# Patient Record
Sex: FEMALE | Race: OTHER | NOT HISPANIC OR LATINO
[De-identification: names, ages, dates, MRNs, and addresses within clinical notes are randomized per-mention and may not be internally consistent; named-entity substitution may affect disease eponyms.]

---

## 2019-03-14 ENCOUNTER — APPOINTMENT (OUTPATIENT)
Dept: OTOLARYNGOLOGY | Facility: CLINIC | Age: 69
End: 2019-03-14
Payer: MEDICARE

## 2019-03-14 VITALS
DIASTOLIC BLOOD PRESSURE: 66 MMHG | SYSTOLIC BLOOD PRESSURE: 146 MMHG | BODY MASS INDEX: 21.97 KG/M2 | HEART RATE: 68 BPM | WEIGHT: 124 LBS | HEIGHT: 63 IN

## 2019-03-14 DIAGNOSIS — Z87.448 PERSONAL HISTORY OF OTHER DISEASES OF URINARY SYSTEM: ICD-10-CM

## 2019-03-14 DIAGNOSIS — Z78.9 OTHER SPECIFIED HEALTH STATUS: ICD-10-CM

## 2019-03-14 DIAGNOSIS — Z00.00 ENCOUNTER FOR GENERAL ADULT MEDICAL EXAMINATION W/OUT ABNORMAL FINDINGS: ICD-10-CM

## 2019-03-14 DIAGNOSIS — Z82.2 FAMILY HISTORY OF DEAFNESS AND HEARING LOSS: ICD-10-CM

## 2019-03-14 DIAGNOSIS — H91.93 UNSPECIFIED HEARING LOSS, BILATERAL: ICD-10-CM

## 2019-03-14 DIAGNOSIS — H90.71 MIXED CONDUCTIVE AND SENSORINEURAL HEARING LOSS, UNILATERAL, RIGHT EAR, WITH UNRESTRICTED HEARING ON THE CONTRALATERAL SIDE: ICD-10-CM

## 2019-03-14 PROCEDURE — 99214 OFFICE O/P EST MOD 30 MIN: CPT | Mod: 25

## 2019-03-14 PROCEDURE — 31575 DIAGNOSTIC LARYNGOSCOPY: CPT

## 2019-03-14 PROCEDURE — 92557 COMPREHENSIVE HEARING TEST: CPT

## 2019-03-14 PROCEDURE — 92567 TYMPANOMETRY: CPT

## 2019-03-14 RX ORDER — LORAZEPAM 2 MG/1
2 TABLET ORAL
Qty: 90 | Refills: 0 | Status: ACTIVE | COMMUNITY
Start: 2018-11-19

## 2019-03-14 NOTE — HISTORY OF PRESENT ILLNESS
[de-identified] : CHUY BRITTON is a 68 year old female who comes in complaining of A a persistent filling the entire right ear is blocked. She notes intermittent pulsation but is unsure whether it is associated with her heartbeat. She has less congestion on the left ear and that improves when she pops her ear. She denies otalgia or otorrhea. She complains that she has a persistent upper respiratory tract infection above her throat. She has a history of parathyroidectomy and renal disease and oral dryness. She is going to start dialysis next week.The patient had no other ear nose or throat complaints at this visit.

## 2019-03-14 NOTE — PHYSICAL EXAM
[FreeTextEntry1] : General:\par The patient was alert and oriented and in no distress.\par Voice was clear.\par \par Oral cavity:\par The oral mucosa was normal.\par The oral and base of tongue were clear and without mass.\par The gingival and buccal mucosa were moist and without lesions.\par The palate moved well.\par There was no cleft to the palate.\par There appeared to be good salivary flow.  \par There was no pus, erythema or mass in the oral cavity. Her oral mucosa was quite dry.\par \par Neck: \par The neck was symmetrical.\par The parotid and submandibular glands were normal without masses.\par The trachea was midline and there was no unusual crepitus.\par The thyroid was smooth and nontender and no masses were palpated.\par There was no significant cervical adenopathy.\par She has a well-healed incision.\par \par Ears:\par The external ears were normal without deformity.\par The ear canals were clear.\par The tympanic membranes were intact and normal.\par The canals were somewhat dry.\par \par TMJ:\par The temporomandibular joints were nontender.\par She has crepitus and a cross bite.\par More on the right than on the left.\par \par Neuro:\par Neurologically, the patient was awake, alert, and oriented to person, place and time. There were no obvious focal neurologic abnormalities.  Cranial nerves II through XII were grossly intact.\par She has bruits in both necks\par \par \par Nasal endoscopy: \par CPT 40551\par Procedure Note:\par \par Nasal endoscopy was done with topical anesthesia of Pontocaine and Afrin and a      nasal endoscope.\par Indication: Nasal congestion, rule out sinusitis.\par Procedure: The nasal cavity was anesthetized with topical Afrin and Pontocaine. An  endoscope was used and inserted into the nasal cavity.\par Attention was first paid to the anterior nasal cavity.\par The nasal mucosa was moderately boggy with a significant left septal deflection coming back to the right. There were no polyps, purulence or masses. The nasopharynx was benign.\par \par Flexible fiberoptic laryngoscopy: CPT 77442\par Indications: Dysphagia\par Procedure note:\par  \par Flexible fiberoptic laryngoscopy was performed because of dysphagia and the patient's inability to tolerate adequate mirror examination.\par The nasal cavity was anesthetized with Pontocaine plus Afrin.\par \par A flexible fiberoptic laryngoscope was placed into the patient's nasal cavity.\par The nasopharynx was without mass.\par The oropharynx and base of tongue were normal and without mass.\par The epiglottis and base of tongue were normal.\par The piriform sinuses were without mucosal lesions or pooling of secretions.\par The lateral pharyngeal walls were clear and symmetrical.\par The vocal cords were clear and mobile; the abducted and adducted normally.\par There was significant posterior laryngeal inflammation consistent with reflux.\par \par Her audiogram showed normal hearing in the left ear with a small low-frequency conductive hearing loss in the right ear with a shallow tympanograms \par This was reviewed with her.\par \par

## 2019-03-14 NOTE — HISTORY OF PRESENT ILLNESS
[de-identified] : CHUY BRITTON is a 68 year old female who comes in complaining of A a persistent filling the entire right ear is blocked. She notes intermittent pulsation but is unsure whether it is associated with her heartbeat. She has less congestion on the left ear and that improves when she pops her ear. She denies otalgia or otorrhea. She complains that she has a persistent upper respiratory tract infection above her throat. She has a history of parathyroidectomy and renal disease and oral dryness. She is going to start dialysis next week.The patient had no other ear nose or throat complaints at this visit.

## 2019-03-14 NOTE — ASSESSMENT
[FreeTextEntry1] : It was my impression that she has the feeling of fullness and pulsation in the right ear.  some of this is likely from her temporomandibular joint.  She has crepitus and a right cross bite. I recommended warm c\par She has a conductive Component in the right ear With a shallow tympanogram on that side.\par Her persistent sore throat and her ear fullness also likely largely due to reflux.\par I suggested using Zantac regularly, 300 mg at bedtime. She has been taking it intermittently after eating and complained significantly of reflux.\par I suggested an antireflux diet and elevation of the head of the bed.\par Her oral dryness is likely from her medication.\par Because of the pulsatile nature of the fullness and bruits in her neck I suggested that she speak to her internist about carotid Dopplers as well if not done\par Otherwise I reassured her and suggested follow up in 3 months or earlier if her symptoms persist.\par

## 2019-03-14 NOTE — PHYSICAL EXAM
[FreeTextEntry1] : General:\par The patient was alert and oriented and in no distress.\par Voice was clear.\par \par Oral cavity:\par The oral mucosa was normal.\par The oral and base of tongue were clear and without mass.\par The gingival and buccal mucosa were moist and without lesions.\par The palate moved well.\par There was no cleft to the palate.\par There appeared to be good salivary flow.  \par There was no pus, erythema or mass in the oral cavity. Her oral mucosa was quite dry.\par \par Neck: \par The neck was symmetrical.\par The parotid and submandibular glands were normal without masses.\par The trachea was midline and there was no unusual crepitus.\par The thyroid was smooth and nontender and no masses were palpated.\par There was no significant cervical adenopathy.\par She has a well-healed incision.\par \par Ears:\par The external ears were normal without deformity.\par The ear canals were clear.\par The tympanic membranes were intact and normal.\par The canals were somewhat dry.\par \par TMJ:\par The temporomandibular joints were nontender.\par She has crepitus and a cross bite.\par More on the right than on the left.\par \par Neuro:\par Neurologically, the patient was awake, alert, and oriented to person, place and time. There were no obvious focal neurologic abnormalities.  Cranial nerves II through XII were grossly intact.\par She has bruits in both necks\par \par \par Nasal endoscopy: \par CPT 53200\par Procedure Note:\par \par Nasal endoscopy was done with topical anesthesia of Pontocaine and Afrin and a      nasal endoscope.\par Indication: Nasal congestion, rule out sinusitis.\par Procedure: The nasal cavity was anesthetized with topical Afrin and Pontocaine. An  endoscope was used and inserted into the nasal cavity.\par Attention was first paid to the anterior nasal cavity.\par The nasal mucosa was moderately boggy with a significant left septal deflection coming back to the right. There were no polyps, purulence or masses. The nasopharynx was benign.\par \par Flexible fiberoptic laryngoscopy: CPT 41485\par Indications: Dysphagia\par Procedure note:\par  \par Flexible fiberoptic laryngoscopy was performed because of dysphagia and the patient's inability to tolerate adequate mirror examination.\par The nasal cavity was anesthetized with Pontocaine plus Afrin.\par \par A flexible fiberoptic laryngoscope was placed into the patient's nasal cavity.\par The nasopharynx was without mass.\par The oropharynx and base of tongue were normal and without mass.\par The epiglottis and base of tongue were normal.\par The piriform sinuses were without mucosal lesions or pooling of secretions.\par The lateral pharyngeal walls were clear and symmetrical.\par The vocal cords were clear and mobile; the abducted and adducted normally.\par There was significant posterior laryngeal inflammation consistent with reflux.\par \par Her audiogram showed normal hearing in the left ear with a small low-frequency conductive hearing loss in the right ear with a shallow tympanograms \par This was reviewed with her.\par \par

## 2021-05-04 ENCOUNTER — APPOINTMENT (OUTPATIENT)
Dept: OTOLARYNGOLOGY | Facility: CLINIC | Age: 71
End: 2021-05-04
Payer: MEDICARE

## 2021-05-04 VITALS — TEMPERATURE: 97.1 F

## 2021-05-04 DIAGNOSIS — R07.0 PAIN IN THROAT: ICD-10-CM

## 2021-05-04 PROCEDURE — 31575 DIAGNOSTIC LARYNGOSCOPY: CPT

## 2021-05-04 PROCEDURE — 92557 COMPREHENSIVE HEARING TEST: CPT

## 2021-05-04 PROCEDURE — 92567 TYMPANOMETRY: CPT

## 2021-05-04 PROCEDURE — 99213 OFFICE O/P EST LOW 20 MIN: CPT | Mod: 25

## 2021-05-04 NOTE — HISTORY OF PRESENT ILLNESS
[de-identified] : Patient previously seen by Dr. Bernal March 2019 were right ear fullness, intermittent pulsatile sensation, left ear congestion, and throat discomfort. She has a history of parathyroidectomy and renal disease on dialysis  as well as oral dryness.  She was diagnosed with TMJ crepitus, reflux, mild right conductive hearing loss.\par \par Today he complains of several months of intermittent left-sided otalgia. It was and yesterday, and she has occasional headaches. He otalgia worsens when she bites down, but reports she does not have teeth in her left maxilla. She does not wear her partial denture.  Has not been to the dentist recently.  Denies otalgia, otorrhea, tinnitus, or vertigo.  Patient has no previous otologic history or acoustic trauma.

## 2021-05-04 NOTE — ASSESSMENT
[FreeTextEntry1] : Several months of intermittent left otalgia. We discussed non-otologic sources of otalgia including dental, and I asked her to see her dentist. (She has no teeth and the left maxilla however. )  Discussed referred otalgia from head and neck masses, which I do not see of palpate at BOT/tonsil.  I believe she has TMJ as the source of her symptoms, and recommended TMJ precautions. A written handout was given. She will follow up in 2-3 weeks, though he consider imaging studies at that time.  I reminded her this was also her diagnosis with she saw Dr Bernal 3/2019\par \par I reassured her that her hearing showed only very mild sensory neural loss across the board, which we will monitor

## 2021-05-25 ENCOUNTER — APPOINTMENT (OUTPATIENT)
Dept: OTOLARYNGOLOGY | Facility: CLINIC | Age: 71
End: 2021-05-25
Payer: MEDICARE

## 2021-05-25 VITALS — TEMPERATURE: 98 F

## 2021-05-25 DIAGNOSIS — H69.80 OTHER SPECIFIED DISORDERS OF EUSTACHIAN TUBE, UNSPECIFIED EAR: ICD-10-CM

## 2021-05-25 PROCEDURE — 99214 OFFICE O/P EST MOD 30 MIN: CPT

## 2021-05-25 NOTE — ASSESSMENT
[FreeTextEntry1] : Several months of intermittent left otalgia, improved spontaneously by 50%.  Will try AStelin and Flonase Rx for possible ETD, FU 4 weeks.   Discussed referred otalgia from head and neck masses, which I do not see of palpate at BOT/tonsil.  I believe she has TMJ as the source of her symptoms, and recommended TMJ precautions.Cconsider imaging studies at that time.  I reminded her this was also her diagnosis with she saw Dr Bernal 3/2019\par \par

## 2021-05-25 NOTE — HISTORY OF PRESENT ILLNESS
[de-identified] : Patient previously seen by Dr. Bernal March 2019 with right ear fullness, intermittent pulsatile sensation, left ear congestion, and throat discomfort. She has a history of parathyroidectomy and renal disease on dialysis  as well as oral dryness.  She was diagnosed with TMJ crepitus, reflux, mild right conductive hearing loss.\par \par Seen May 4, complained of several months of intermittent left-sided otalgia. She has occasional headaches. The otalgia worsens when she bites down, but reports she does not have teeth in her left maxilla. She does not wear her partial denture.  Had not been to the dentist recently.  Denies otalgia, otorrhea, tinnitus, or vertigo.  Patient has no previous otologic history or acoustic trauma.  Scope and BOT exam were normal, type A tympanograms, suspected TMJ.  Recommended dental eval who denied TMJ etiology.  Pain 50% improved, on Tylenol occ.  Still has some aural fullness.  Wants to retry the nasal spray previously given by Dr Bernal

## 2021-06-29 ENCOUNTER — APPOINTMENT (OUTPATIENT)
Dept: OTOLARYNGOLOGY | Facility: CLINIC | Age: 71
End: 2021-06-29
Payer: MEDICARE

## 2021-06-29 VITALS — TEMPERATURE: 98.5 F

## 2021-06-29 DIAGNOSIS — K21.9 GASTRO-ESOPHAGEAL REFLUX DISEASE W/OUT ESOPHAGITIS: ICD-10-CM

## 2021-06-29 PROCEDURE — 31231 NASAL ENDOSCOPY DX: CPT

## 2021-06-29 PROCEDURE — 92567 TYMPANOMETRY: CPT

## 2021-06-29 PROCEDURE — 99214 OFFICE O/P EST MOD 30 MIN: CPT | Mod: 25

## 2021-06-29 NOTE — PHYSICAL EXAM
[FreeTextEntry1] : \par The patient was alert and oriented and in no distress.\par Voice was clear.\par \par Face:\par The patient had no facial asymmetry or mass.\par The skin was unremarkable.\par \par Eyes:\par The pupils were equal round and reactive to light and accommodation.\par There was no significant nystagmus or disconjugate gaze noted.\par \par Nose: \par The external nose had no significant deformity.  There was no facial tenderness.  On anterior rhinoscopy, the nasal mucosa was clear.  The anterior septum was midline.  There were no visualized polyps purulence  or masses.\par \par Oral cavity:\par The oral mucosa was normal.\par The oral and base of tongue were clear and without mass.\par The gingival and buccal mucosa were moist and without lesions.\par The palate moved well.\par There was no cleft to the palate.\par There appeared to be good salivary flow.  \par There was no pus, erythema or mass in the oral cavity.\par \par \par Ears:\par The external ears were normal without deformity.\par The ear canals were clear.\par The tympanic membranes were intact and normal.\par \par Neck: \par The neck was symmetrical.\par The parotid and submandibular glands were normal without masses.\par The trachea was midline and there was no unusual crepitus.\par The thyroid was smooth and nontender and no masses were palpated.\par There was no significant cervical adenopathy.\par \par \par Neuro:\par Neurologically, the patient was awake, alert, and oriented to person, place and time. There were no obvious focal neurologic abnormalities.  Cranial nerves II through XII were grossly intact.\par \par \par TMJ:\par The temporomandibular joints were nontender.\par There was no abnormal crepitus and no significant malocclusion\par The jaw was tight but improved her cross bite was better..\par \par \par  [de-identified] : Tympanograms were ordered and were type A bilaterally\par \par Nasal endoscopy: \par CPT 43309\par Procedure Note:\par \par Endoscopy was done with Covid precautions and with video. All risks and benefits were discussed with the patient and consent obtained.\par \par Nasal endoscopy was done with topical anesthesia of Pontocaine and Afrin and a      nasal endoscope.\par Indication: Nasal congestion, rule out sinusitis.\par Procedure: The nasal cavity was anesthetized with topical Afrin and Pontocaine. An  endoscope was used and inserted into the nasal cavity.\par Attention was first paid to the anterior nasal cavity.\par Endocoscopy was performed to inspect the interior of the nasal cavity, the nasal septum,  the middle and superior meati, the inferior, middle and superior turbinates, and the spheno-ethmoidal  recesses, the nasopharynx and eustachian tube orifices bilaterally. \par All findings were normal except:\par \par This mucosa is moderately boggy with a left septal deflection without polyps, purulence or masses.\par \par Flexible fiberoptic laryngoscopy: CPT 78052\par Indications: Dysphagia\par Procedure note:\par \par Flexible fiberoptic laryngoscopy was performed because of dysphagia and because of the patient's inability to tolerate adequate mirror examination.\par \par The nasal cavity was anesthetized with Pontocaine and Afrin.\par The flexible endoscope was placed into the patient's nasal cavity.\par The nasopharynx was without masses.\par The oropharynx, vallecula and base of tongue had no masses.\par The epiglottis, aryepiglottic folds and false vocal cords were normal.\par The pyriform sinuses were without mucosal lesions or pooling of secretions.  \par The laryngeal ventricles were without lesions.\par The visualized subglottis was without mass.\par The lateral and posterior pharyngeal walls were clear and symmetrical.\par The vocal folds were clear and mobile; they abducted and adducted normally.\par There was no interarytenoid mass or fullness.\par \par Endoscopy was done with Covid precautions and with video. All risks and benefits were discussed with the patient and consent obtained. There was moderate posterior laryngeal inflammation\par \par She has tightness of the left sternomastoid muscle\par Her base of tongue was soft to palpation and she has no internal pterygoid muscle tenderness

## 2021-06-29 NOTE — HISTORY OF PRESENT ILLNESS
[de-identified] : CHUY FRANKS Was seen in followup on June 29. I had seen her 2-1/2 years ago with otalgia and findings consistent with a temporomandibular joint dysfunction. She had seen  last month and was treated with nasal steroids and for her temporomandibular joint. She notes that her hearing is fine and her pain has resolved but she still feels full on the left. She denies any significant reflux.The patient had no other ear nose or throat complaints at this visit.

## 2021-06-29 NOTE — ASSESSMENT
[FreeTextEntry1] : It was my impression that she still has the fullness of the left ear. Her temporomandibular joint findings are improved. She does have a tightness of the left sternomastoid muscle and I recommended one compress. She cannot use and anti-inflammatory and I suggested using Tylenol.\par Septal deflection and a rhinitis but I did not believe eustachian tube dysfunction she still has type A. tympanograms.\par She does have laryngeal evidence of reflux which may well be the cause of the fullness\par It was my impression is that the patient's symptoms were from laryngeal evidence of reflux.  I reviewed an anti-reflux diet at length with the patient.  I recommended a course of famotidine 40 mg at bedtime.  I suggested a repeat evaluation in 4-6 weeks to make sure that the patient is improving.  If not I would recommend further evaluation including possibly pH testing, PPI therapy and/or further GI evaluation.\par

## 2021-08-03 ENCOUNTER — APPOINTMENT (OUTPATIENT)
Dept: OTOLARYNGOLOGY | Facility: CLINIC | Age: 71
End: 2021-08-03

## 2021-11-09 ENCOUNTER — NON-APPOINTMENT (OUTPATIENT)
Age: 71
End: 2021-11-09

## 2022-08-01 ENCOUNTER — APPOINTMENT (OUTPATIENT)
Dept: INTERNAL MEDICINE | Facility: CLINIC | Age: 72
End: 2022-08-01

## 2022-08-01 VITALS
HEIGHT: 60 IN | SYSTOLIC BLOOD PRESSURE: 126 MMHG | WEIGHT: 109 LBS | BODY MASS INDEX: 21.4 KG/M2 | TEMPERATURE: 97.8 F | HEART RATE: 64 BPM | DIASTOLIC BLOOD PRESSURE: 76 MMHG | OXYGEN SATURATION: 97 %

## 2022-08-01 PROCEDURE — 99204 OFFICE O/P NEW MOD 45 MIN: CPT | Mod: GC

## 2022-08-01 RX ORDER — HYDRALAZINE HYDROCHLORIDE 25 MG/1
25 TABLET ORAL
Qty: 90 | Refills: 0 | Status: COMPLETED | COMMUNITY
Start: 2019-01-16 | End: 2022-08-01

## 2022-08-01 RX ORDER — CARVEDILOL 6.25 MG/1
6.25 TABLET, FILM COATED ORAL
Qty: 120 | Refills: 0 | Status: COMPLETED | COMMUNITY
Start: 2019-01-02 | End: 2022-08-01

## 2022-08-01 RX ORDER — FLUTICASONE PROPIONATE 50 UG/1
50 SPRAY, METERED NASAL
Qty: 1 | Refills: 1 | Status: COMPLETED | COMMUNITY
Start: 2021-05-25 | End: 2022-08-01

## 2022-08-01 RX ORDER — FLUTICASONE PROPIONATE 50 UG/1
50 SPRAY, METERED NASAL DAILY
Qty: 1 | Refills: 5 | Status: COMPLETED | COMMUNITY
Start: 2019-03-14 | End: 2022-08-01

## 2022-08-01 RX ORDER — IRON POLYSACCHARIDE COMPLEX 150 MG
150 CAPSULE ORAL
Qty: 30 | Refills: 0 | Status: COMPLETED | COMMUNITY
Start: 2019-01-17 | End: 2022-08-01

## 2022-08-01 RX ORDER — SODIUM BICARBONATE 650 MG/1
650 TABLET ORAL
Qty: 180 | Refills: 0 | Status: COMPLETED | COMMUNITY
Start: 2019-01-24 | End: 2022-08-01

## 2022-08-01 RX ORDER — CLONIDINE HYDROCHLORIDE 0.2 MG/1
0.2 TABLET ORAL
Qty: 60 | Refills: 0 | Status: COMPLETED | COMMUNITY
Start: 2019-01-17 | End: 2022-08-01

## 2022-08-01 RX ORDER — RANITIDINE 300 MG/1
300 TABLET ORAL
Qty: 30 | Refills: 5 | Status: COMPLETED | COMMUNITY
Start: 2019-03-14 | End: 2022-08-01

## 2022-08-01 RX ORDER — HYDRALAZINE HYDROCHLORIDE 50 MG/1
50 TABLET ORAL
Qty: 180 | Refills: 0 | Status: COMPLETED | COMMUNITY
Start: 2019-01-24 | End: 2022-08-01

## 2022-08-01 RX ORDER — NIFEDIPINE 60 MG/1
60 TABLET, FILM COATED, EXTENDED RELEASE ORAL
Qty: 60 | Refills: 0 | Status: COMPLETED | COMMUNITY
Start: 2019-01-16 | End: 2022-08-01

## 2022-08-01 RX ORDER — LITHIUM CARBONATE 300 MG/1
300 TABLET ORAL
Qty: 90 | Refills: 0 | Status: COMPLETED | COMMUNITY
Start: 2018-09-17 | End: 2022-08-01

## 2022-08-01 RX ORDER — LITHIUM CARBONATE 150 MG/1
150 CAPSULE ORAL
Qty: 30 | Refills: 0 | Status: COMPLETED | COMMUNITY
Start: 2019-02-11 | End: 2022-08-01

## 2022-08-01 RX ORDER — AZELASTINE HYDROCHLORIDE 137 UG/1
0.1 SPRAY, METERED NASAL TWICE DAILY
Qty: 1 | Refills: 0 | Status: COMPLETED | COMMUNITY
Start: 2021-05-25 | End: 2022-08-01

## 2022-08-01 NOTE — HISTORY OF PRESENT ILLNESS
[No Pertinent Cardiac History] : no history of aortic stenosis, atrial fibrillation, coronary artery disease, recent myocardial infarction, or implantable device/pacemaker [No Pertinent Pulmonary History] : no history of asthma, COPD, sleep apnea, or smoking [Chronic Kidney Disease] : chronic kidney disease [(Patient denies any chest pain, claudication, dyspnea on exertion, orthopnea, palpitations or syncope)] : Patient denies any chest pain, claudication, dyspnea on exertion, orthopnea, palpitations or syncope [Spouse] : spouse [Unable to assess] : unable to assess [Chronic Anticoagulation] : no chronic anticoagulation [Diabetes] : no diabetes [FreeTextEntry1] : ECT [FreeTextEntry2] : 08/03/2022 [FreeTextEntry3] : Psychiatrist Dr. Adam Jolly [FreeTextEntry4] : 72 y/o woman with PMH of major depression (s/p anti depressants, TMS and ketamine), HTN, CKD on HD, laryngopharyngeal reflux, presenting for ECT clearance. The patient reports feeling well, but is concerned about getting medical clearance for her procedure. She receives HD three times a week and does not report any issues. The etiology of her CKD is lithium use for more than 40 years. \par She denies any SOB, chest pain w/ or w/out excretion, she has normal bowel movements, and no issues with urination. She has had no history of seizures, TIAs/strokes, brain tumors or other neurosurgical/neurological conditions, and does not have any implanted devices.  \par She recently had a visit to her nephrologist where she received routine labs and ECG which were all normal. [FreeTextEntry8] : Limited secondary to mood/lack of motivation coupled with some lower extremity weakness

## 2022-08-01 NOTE — ASSESSMENT
[High Risk Surgery - Intraperitoneal, Intrathoracic or Supringuinal Vascular Procedures] : High Risk Surgery - Intraperitoneal, Intrathoracic or Supringuinal Vascular Procedures - No (0) [Ischemic Heart Disease] : Ischemic Heart Disease - No (0) [Congestive Heart Failure] : Congestive Heart Failure - No (0) [Prior Cerebrovascular Accident or TIA] : Prior Cerebrovascular Accident or TIA - No (0) [Creatinine >= 2mg/dL (1 Point)] : Creatinine >= 2mg/dL - Yes (1) [Insulin-dependent Diabetic (1 Point)] : Insulin-dependent Diabetic - No (0) [1] : 1 , RCRI Class: II, Risk of Post-Op Cardiac Complications: 6.0%, 95% CI for Risk Estimate: 4.9% - 7.4% [Patient Optimized for Surgery] : Patient optimized for surgery [No Further Testing Recommended] : no further testing recommended [FreeTextEntry4] : 72 y/o woman with major depression (s/p anti depressants, TMS and ketamine), HTN, CKD on HD, laryngopharyngeal reflux, presenting for ECT clearance. \par The patient's EKG (7/22) and labs (7/19) were normal. The patient's medical history is also insignificant for any contraindications to ECT. She has a good functional status,  able to perform ADLs. She is optimized for the procedure.

## 2022-08-01 NOTE — REVIEW OF SYSTEMS
[Anxiety] : anxiety [Depression] : depression [Fever] : no fever [Chills] : no chills [Night Sweats] : no night sweats [Chest Pain] : no chest pain [Palpitations] : no palpitations [Lower Ext Edema] : no lower extremity edema [Orthopnea] : no orthopnea [Shortness Of Breath] : no shortness of breath [Wheezing] : no wheezing [Cough] : no cough [Dyspnea on Exertion] : no dyspnea on exertion [Abdominal Pain] : no abdominal pain [Nausea] : no nausea [Constipation] : no constipation [Diarrhea] : diarrhea [Vomiting] : no vomiting [Dysuria] : no dysuria [Incontinence] : no incontinence [Nocturia] : no nocturia [Joint Pain] : no joint pain [Joint Stiffness] : no joint stiffness [Joint Swelling] : no joint swelling [Muscle Weakness] : no muscle weakness [Headache] : no headache [Dizziness] : no dizziness [Fainting] : no fainting [Easy Bleeding] : no easy bleeding [Easy Bruising] : no easy bruising [Swollen Glands] : no swollen glands

## 2022-08-01 NOTE — END OF VISIT
[] : Resident [FreeTextEntry3] :  71 year old F h/o ESRD 2.2 lithium toxicity on HD [facility] (previously [facility]) presenting to establish care and for pre-procedural exam for planned ECT for resistant bipolar depression. Pt had EKG and labs performed at her nephrologist's office 2 weeks ago which we received by fax and reviewed following the visit. She has a functional status of at least 4 mets and no acute complaints. On exam, well appearing thin female NAD. AVF noted L forearm. RCRI class 2 for low risk procedure. No further pre-procedural testing needed, pt is optimized for ECT later this week. RTC in 6-8 weeks for f/u above or earlier prn.

## 2022-08-02 NOTE — ASU PATIENT PROFILE, ADULT - NSICDXPASTMEDICALHX_GEN_ALL_CORE_FT
PAST MEDICAL HISTORY:  Acid reflux     Acute kidney failure diayalsis 3 x week    Anxiety and depression     HTN (hypertension)

## 2022-08-02 NOTE — ASU PATIENT PROFILE, ADULT - FALL HARM RISK - UNIVERSAL INTERVENTIONS
Bed in lowest position, wheels locked, appropriate side rails in place/Call bell, personal items and telephone in reach/Instruct patient to call for assistance before getting out of bed or chair/Non-slip footwear when patient is out of bed/[place] to call system/Physically safe environment - no spills, clutter or unnecessary equipment/Purposeful Proactive Rounding/Room/bathroom lighting operational, light cord in reach

## 2022-08-03 ENCOUNTER — TRANSCRIPTION ENCOUNTER (OUTPATIENT)
Age: 72
End: 2022-08-03

## 2022-08-03 ENCOUNTER — OUTPATIENT (OUTPATIENT)
Dept: OUTPATIENT SERVICES | Facility: HOSPITAL | Age: 72
LOS: 1 days | Discharge: HOME | End: 2022-08-03

## 2022-08-03 VITALS
RESPIRATION RATE: 16 BRPM | TEMPERATURE: 98 F | HEART RATE: 56 BPM | HEIGHT: 60 IN | DIASTOLIC BLOOD PRESSURE: 64 MMHG | WEIGHT: 108.69 LBS | SYSTOLIC BLOOD PRESSURE: 140 MMHG

## 2022-08-03 VITALS
RESPIRATION RATE: 16 BRPM | SYSTOLIC BLOOD PRESSURE: 143 MMHG | HEART RATE: 71 BPM | OXYGEN SATURATION: 97 % | DIASTOLIC BLOOD PRESSURE: 69 MMHG

## 2022-08-03 DIAGNOSIS — Z98.890 OTHER SPECIFIED POSTPROCEDURAL STATES: Chronic | ICD-10-CM

## 2022-08-03 DIAGNOSIS — N18.6 END STAGE RENAL DISEASE: ICD-10-CM

## 2022-08-03 LAB
ALBUMIN SERPL ELPH-MCNC: 4 G/DL — SIGNIFICANT CHANGE UP (ref 3.3–5)
ALP SERPL-CCNC: 148 U/L — HIGH (ref 40–120)
ALT FLD-CCNC: 31 U/L — SIGNIFICANT CHANGE UP (ref 10–45)
ANION GAP SERPL CALC-SCNC: 13 MMOL/L — SIGNIFICANT CHANGE UP (ref 5–17)
AST SERPL-CCNC: 32 U/L — SIGNIFICANT CHANGE UP (ref 10–40)
BILIRUB SERPL-MCNC: 0.8 MG/DL — SIGNIFICANT CHANGE UP (ref 0.2–1.2)
BUN SERPL-MCNC: 38 MG/DL — HIGH (ref 7–23)
CALCIUM SERPL-MCNC: 9.6 MG/DL — SIGNIFICANT CHANGE UP (ref 8.4–10.5)
CHLORIDE SERPL-SCNC: 93 MMOL/L — LOW (ref 96–108)
CO2 SERPL-SCNC: 30 MMOL/L — SIGNIFICANT CHANGE UP (ref 22–31)
CREAT SERPL-MCNC: 5.8 MG/DL — HIGH (ref 0.5–1.3)
EGFR: 7 ML/MIN/1.73M2 — LOW
GLUCOSE SERPL-MCNC: 98 MG/DL — SIGNIFICANT CHANGE UP (ref 70–99)
POTASSIUM SERPL-MCNC: 4.3 MMOL/L — SIGNIFICANT CHANGE UP (ref 3.5–5.3)
POTASSIUM SERPL-SCNC: 4.3 MMOL/L — SIGNIFICANT CHANGE UP (ref 3.5–5.3)
PROT SERPL-MCNC: 7.2 G/DL — SIGNIFICANT CHANGE UP (ref 6–8.3)
SODIUM SERPL-SCNC: 136 MMOL/L — SIGNIFICANT CHANGE UP (ref 135–145)

## 2022-08-03 PROCEDURE — 90870 ELECTROCONVULSIVE THERAPY: CPT

## 2022-08-03 RX ORDER — SODIUM CHLORIDE 9 MG/ML
500 INJECTION INTRAMUSCULAR; INTRAVENOUS; SUBCUTANEOUS
Refills: 0 | Status: DISCONTINUED | OUTPATIENT
Start: 2022-08-03 | End: 2022-08-03

## 2022-08-03 RX ORDER — ACETAMINOPHEN 500 MG
650 TABLET ORAL ONCE
Refills: 0 | Status: DISCONTINUED | OUTPATIENT
Start: 2022-08-03 | End: 2022-08-03

## 2022-08-03 RX ORDER — ONDANSETRON 8 MG/1
4 TABLET, FILM COATED ORAL ONCE
Refills: 0 | Status: DISCONTINUED | OUTPATIENT
Start: 2022-08-03 | End: 2022-08-03

## 2022-08-03 NOTE — ASU DISCHARGE PLAN (ADULT/PEDIATRIC) - NS MD DC FALL RISK RISK
For information on Fall & Injury Prevention, visit: [email or web address] OR  [email or web address] OR  https://www.cdc.gov/steadi/patient.html

## 2022-08-03 NOTE — PRE-ANESTHESIA EVALUATION ADULT - NSANTHOSAYNRD_GEN_A_CORE
No. JULEE screening performed.  STOP BANG Legend: 0-2 = LOW Risk; 3-4 = INTERMEDIATE Risk; 5-8 = HIGH Risk

## 2022-08-03 NOTE — ASU DISCHARGE PLAN (ADULT/PEDIATRIC) - ASU DC SPECIAL INSTRUCTIONSFT
AMBULATORY ELECTROCONVULSIVE THERAPY (ECT) DISCHARGE INSTRUCTIONS    Condition:  Stable for discharge to home.    1. Activities: Rest the day of your treatment.  It is normal to experience drowsiness and possibly some confusion following ECT and anesthesia.  DO NOT consume alcohol, operative machinery, drive or make important personal, business or legal decisions for at least 24 hours.    2. Diet: Begin with a light meal following ECT and progress as tolerated to a regular diet (ie. resume your normal food intake).    3. Medications: Resume medications as prescribed by your outpatient physicians.  Mild aches or headache may be experienced post treatment.  This is usually relieved  by Tylenol or other non-aspirin medications (take only amounts prescribed by each ).      4. Emergencies:  Please first call your regular outpatient psychiatric provider (ie. psychopharmacologist) for any changes in your psychiatric symptoms.   If you are unable to reach your doctor, you go directly to your local Emergency Room.      5. Routine Follow-Up Information:   Please call the ECT Department if you are unable to make your next appointment or have any questions about your ECT treatments.  You may also contact Adam Banks MD at [email or web address] or [phone] for non-urgent clinical questions about your treatment course.      REMEMBER TO NOT EAT ANYTHING FOR 8 HOURS BEFORE YOUR NEXT TREATMENT.   YOU MAY DRINK ONLY CLEAR LIQUIDS UP TO 3 HOURS BEFORE YOUR TREATMENT (eg. WATER, APPLE JUICE, GINGER ALE).    SOMEONE OVER 18 YEARS OLD MUST ACCOMPANY YOU HOME FROM THE HOSPITAL ON THE DAY OF YOUR TREATMENT.    IF YOU HAVE ANY QUESTIONS ABOUT YOUR NEXT TREATMENT, COVID TESTING, OR EXPIRATION DATES OF YOUR MEDICAL CLEARANCE, PLEASE CONTACT:     ECT Department:  Lakshmi Thomas” Angelina [phone] office; [phone] fax      Your next ECT treatment will be on: Friday 8/5/22

## 2022-08-03 NOTE — ECT TREATMENT NOTE - NSECTPTEVAL_PSY_ALL_CORE
Patient evaluated and History and Physical reviewed prior to ECT. There are no significant changes to the patient's condition unless specified.

## 2022-08-03 NOTE — ECT TREATMENT NOTE - NSECTPOSTTXSUMMARY_PSY_ALL_CORE
The patient had a well modified grand mal seizure under general anesthesia and a muscle relaxant.  The patient is alert, responsive, in no acute distress.  Recovery uneventful.

## 2022-08-03 NOTE — ECT TREATMENT NOTE - NSECTCOMMENTS_PSY_ALL_CORE
K checked and stable.  1st outpt ECT. Consent obtained for Bifrontal ECT.  Still depressed, no change in symptoms from consult (copy in chart).  BDI 35 today.  AOx3.

## 2022-08-04 NOTE — ASU PATIENT PROFILE, ADULT - NSICDXPASTSURGICALHX_GEN_ALL_CORE_FT
PAST SURGICAL HISTORY:  History of parathyroid surgery      PAST SURGICAL HISTORY:  History of parathyroid surgery     S/P ECT (electroconvulsive therapy)

## 2022-08-04 NOTE — ASU PATIENT PROFILE, ADULT - ABLE TO REACH PT
Voicemail instruction left. Patient instructed to arrive at 8AM. nothing to eat or drink after midnight. Patient reminded to come with photo ID, vaccination and insurance card. Dress in comfortable clothes, no jewelries, no smoking, alcohol drinking or illicit drug use tonight. Escort must show proof of vaccination. Address and call back number given/no

## 2022-08-05 ENCOUNTER — TRANSCRIPTION ENCOUNTER (OUTPATIENT)
Age: 72
End: 2022-08-05

## 2022-08-05 ENCOUNTER — OUTPATIENT (OUTPATIENT)
Dept: OUTPATIENT SERVICES | Facility: HOSPITAL | Age: 72
LOS: 1 days | Discharge: HOME | End: 2022-08-05
Payer: MEDICARE

## 2022-08-05 VITALS
DIASTOLIC BLOOD PRESSURE: 62 MMHG | SYSTOLIC BLOOD PRESSURE: 155 MMHG | HEART RATE: 61 BPM | OXYGEN SATURATION: 96 % | RESPIRATION RATE: 13 BRPM

## 2022-08-05 VITALS
WEIGHT: 108.69 LBS | TEMPERATURE: 98 F | HEIGHT: 60 IN | DIASTOLIC BLOOD PRESSURE: 57 MMHG | SYSTOLIC BLOOD PRESSURE: 141 MMHG | RESPIRATION RATE: 16 BRPM | OXYGEN SATURATION: 98 % | HEART RATE: 57 BPM

## 2022-08-05 DIAGNOSIS — Z98.890 OTHER SPECIFIED POSTPROCEDURAL STATES: Chronic | ICD-10-CM

## 2022-08-05 LAB
ALBUMIN SERPL ELPH-MCNC: 4 G/DL — SIGNIFICANT CHANGE UP (ref 3.3–5)
ALP SERPL-CCNC: 149 U/L — HIGH (ref 40–120)
ALT FLD-CCNC: 35 U/L — SIGNIFICANT CHANGE UP (ref 10–45)
ANION GAP SERPL CALC-SCNC: 9 MMOL/L — SIGNIFICANT CHANGE UP (ref 5–17)
AST SERPL-CCNC: 35 U/L — SIGNIFICANT CHANGE UP (ref 10–40)
BILIRUB SERPL-MCNC: 0.7 MG/DL — SIGNIFICANT CHANGE UP (ref 0.2–1.2)
BUN SERPL-MCNC: 34 MG/DL — HIGH (ref 7–23)
CALCIUM SERPL-MCNC: 10.1 MG/DL — SIGNIFICANT CHANGE UP (ref 8.4–10.5)
CHLORIDE SERPL-SCNC: 96 MMOL/L — SIGNIFICANT CHANGE UP (ref 96–108)
CO2 SERPL-SCNC: 29 MMOL/L — SIGNIFICANT CHANGE UP (ref 22–31)
CREAT SERPL-MCNC: 5.2 MG/DL — HIGH (ref 0.5–1.3)
EGFR: 8 ML/MIN/1.73M2 — LOW
GLUCOSE SERPL-MCNC: 94 MG/DL — SIGNIFICANT CHANGE UP (ref 70–99)
POTASSIUM SERPL-MCNC: 4.5 MMOL/L — SIGNIFICANT CHANGE UP (ref 3.5–5.3)
POTASSIUM SERPL-SCNC: 4.5 MMOL/L — SIGNIFICANT CHANGE UP (ref 3.5–5.3)
PROT SERPL-MCNC: 7.2 G/DL — SIGNIFICANT CHANGE UP (ref 6–8.3)
SODIUM SERPL-SCNC: 134 MMOL/L — LOW (ref 135–145)

## 2022-08-05 PROCEDURE — 90870 ELECTROCONVULSIVE THERAPY: CPT

## 2022-08-05 RX ORDER — SODIUM CHLORIDE 9 MG/ML
250 INJECTION INTRAMUSCULAR; INTRAVENOUS; SUBCUTANEOUS
Refills: 0 | Status: DISCONTINUED | OUTPATIENT
Start: 2022-08-05 | End: 2022-08-05

## 2022-08-05 RX ORDER — ACETAMINOPHEN 500 MG
650 TABLET ORAL ONCE
Refills: 0 | Status: DISCONTINUED | OUTPATIENT
Start: 2022-08-05 | End: 2022-08-05

## 2022-08-05 RX ORDER — ONDANSETRON 8 MG/1
4 TABLET, FILM COATED ORAL ONCE
Refills: 0 | Status: DISCONTINUED | OUTPATIENT
Start: 2022-08-05 | End: 2022-08-05

## 2022-08-05 RX ADMIN — SODIUM CHLORIDE 40 MILLILITER(S): 9 INJECTION INTRAMUSCULAR; INTRAVENOUS; SUBCUTANEOUS at 10:00

## 2022-08-05 NOTE — ASU DISCHARGE PLAN (ADULT/PEDIATRIC) - ASU DC SPECIAL INSTRUCTIONSFT
AMBULATORY ELECTROCONVULSIVE THERAPY (ECT) DISCHARGE INSTRUCTIONS    Condition:  Stable for discharge to home.    1. Activities: Rest the day of your treatment.  It is normal to experience drowsiness and possibly some confusion following ECT and anesthesia.  DO NOT consume alcohol, operative machinery, drive or make important personal, business or legal decisions for at least 24 hours.    2. Diet: Begin with a light meal following ECT and progress as tolerated to a regular diet (ie. resume your normal food intake).    3. Medications: Resume medications as prescribed by your outpatient physicians.  Mild aches or headache may be experienced post treatment.  This is usually relieved  by Tylenol or other non-aspirin medications (take only amounts prescribed by each ).      4. Emergencies:  Please first call your regular outpatient psychiatric provider (ie. psychopharmacologist) for any changes in your psychiatric symptoms.   If you are unable to reach your doctor, you go directly to your local Emergency Room.      5. Routine Follow-Up Information:   Please call the ECT Department if you are unable to make your next appointment or have any questions about your ECT treatments.  You may also contact Adam Banks MD at [email or web address] or [phone] for non-urgent clinical questions about your treatment course.      REMEMBER TO NOT EAT ANYTHING FOR 8 HOURS BEFORE YOUR NEXT TREATMENT.   YOU MAY DRINK ONLY CLEAR LIQUIDS UP TO 3 HOURS BEFORE YOUR TREATMENT (eg. WATER, APPLE JUICE, GINGER ALE).    SOMEONE OVER 18 YEARS OLD MUST ACCOMPANY YOU HOME FROM THE HOSPITAL ON THE DAY OF YOUR TREATMENT.    IF YOU HAVE ANY QUESTIONS ABOUT YOUR NEXT TREATMENT, COVID TESTING, OR EXPIRATION DATES OF YOUR MEDICAL CLEARANCE, PLEASE CONTACT:     ECT Department:  Lakshmi “Jaren” Angelina [phone] office; [phone] fax      Your next ECT treatment will be on: Wed 8/10/22 (acute)

## 2022-08-05 NOTE — ECT TREATMENT NOTE - NSECTCOMMENTS_PSY_ALL_CORE
K checked prior to ECT.  No change in mood after 1st treatment.  Still depressed, no SI.  Some jaw pain after 1st tx with were tx with po Tylenol at home.   Will continue acute ECT.

## 2022-08-05 NOTE — ASU DISCHARGE PLAN (ADULT/PEDIATRIC) - NS MD DC FALL RISK RISK
For information on Fall & Injury Prevention, visit: [email or web address] OR  [email or web address].[email or web address] OR  https://www.cdc.gov/steadi/patient.html

## 2022-08-09 PROBLEM — I10 ESSENTIAL (PRIMARY) HYPERTENSION: Chronic | Status: ACTIVE | Noted: 2022-08-03

## 2022-08-09 PROBLEM — F41.9 ANXIETY DISORDER, UNSPECIFIED: Chronic | Status: ACTIVE | Noted: 2022-08-03

## 2022-08-09 PROBLEM — K21.9 GASTRO-ESOPHAGEAL REFLUX DISEASE WITHOUT ESOPHAGITIS: Chronic | Status: ACTIVE | Noted: 2022-08-03

## 2022-08-09 NOTE — ASU PATIENT PROFILE, ADULT - NS PREOP UNDERSTANDS INFO
Instructions left on voice mail, no solid food after midnight, can have water or clear apple juice no pulp before 07:00 a.m. 08/10/22

## 2022-08-10 ENCOUNTER — TRANSCRIPTION ENCOUNTER (OUTPATIENT)
Age: 72
End: 2022-08-10

## 2022-08-10 ENCOUNTER — OUTPATIENT (OUTPATIENT)
Dept: OUTPATIENT SERVICES | Facility: HOSPITAL | Age: 72
LOS: 1 days | Discharge: HOME | End: 2022-08-10

## 2022-08-10 VITALS
TEMPERATURE: 97 F | SYSTOLIC BLOOD PRESSURE: 149 MMHG | RESPIRATION RATE: 17 BRPM | OXYGEN SATURATION: 96 % | DIASTOLIC BLOOD PRESSURE: 71 MMHG | HEART RATE: 62 BPM

## 2022-08-10 VITALS — OXYGEN SATURATION: 98 % | DIASTOLIC BLOOD PRESSURE: 63 MMHG | SYSTOLIC BLOOD PRESSURE: 137 MMHG | HEART RATE: 56 BPM

## 2022-08-10 DIAGNOSIS — Z98.890 OTHER SPECIFIED POSTPROCEDURAL STATES: Chronic | ICD-10-CM

## 2022-08-10 LAB
ALBUMIN SERPL ELPH-MCNC: 3.8 G/DL — SIGNIFICANT CHANGE UP (ref 3.3–5)
ALP SERPL-CCNC: 137 U/L — HIGH (ref 40–120)
ALT FLD-CCNC: 28 U/L — SIGNIFICANT CHANGE UP (ref 10–45)
ANION GAP SERPL CALC-SCNC: 19 MMOL/L — HIGH (ref 5–17)
AST SERPL-CCNC: 31 U/L — SIGNIFICANT CHANGE UP (ref 10–40)
BILIRUB SERPL-MCNC: 0.7 MG/DL — SIGNIFICANT CHANGE UP (ref 0.2–1.2)
BUN SERPL-MCNC: 34 MG/DL — HIGH (ref 7–23)
CALCIUM SERPL-MCNC: 9.5 MG/DL — SIGNIFICANT CHANGE UP (ref 8.4–10.5)
CHLORIDE SERPL-SCNC: 92 MMOL/L — LOW (ref 96–108)
CO2 SERPL-SCNC: 27 MMOL/L — SIGNIFICANT CHANGE UP (ref 22–31)
CREAT SERPL-MCNC: 5.7 MG/DL — HIGH (ref 0.5–1.3)
EGFR: 7 ML/MIN/1.73M2 — LOW
GLUCOSE SERPL-MCNC: 94 MG/DL — SIGNIFICANT CHANGE UP (ref 70–99)
POTASSIUM SERPL-MCNC: 4.3 MMOL/L — SIGNIFICANT CHANGE UP (ref 3.5–5.3)
POTASSIUM SERPL-SCNC: 4.3 MMOL/L — SIGNIFICANT CHANGE UP (ref 3.5–5.3)
PROT SERPL-MCNC: 6.8 G/DL — SIGNIFICANT CHANGE UP (ref 6–8.3)
SODIUM SERPL-SCNC: 138 MMOL/L — SIGNIFICANT CHANGE UP (ref 135–145)

## 2022-08-10 PROCEDURE — 90870 ELECTROCONVULSIVE THERAPY: CPT

## 2022-08-10 RX ORDER — SODIUM CHLORIDE 9 MG/ML
1000 INJECTION, SOLUTION INTRAVENOUS
Refills: 0 | Status: DISCONTINUED | OUTPATIENT
Start: 2022-08-10 | End: 2022-08-10

## 2022-08-10 RX ORDER — ONDANSETRON 8 MG/1
4 TABLET, FILM COATED ORAL ONCE
Refills: 0 | Status: DISCONTINUED | OUTPATIENT
Start: 2022-08-10 | End: 2022-08-10

## 2022-08-10 NOTE — ASU PREOP CHECKLIST - AICD PRESENT
[FreeTextEntry1] : Mr. Whiteside, s/p removal of nonfunctional VPS 12/2., present today for suture removal. \par Surgical site is clean, dry, wound approximated, well healing. No evidence of infection or drainage.  no

## 2022-08-10 NOTE — ECT TREATMENT NOTE - NSECTCOMMENTS_PSY_ALL_CORE
K checked prior to ECT and is normal.   BDI inc today from last week but patient states she feels "about the same".  Low energy on Sun (but also day after last HD).  Mon had appts to get COVID and no problem motivating go get out of house.  AOx3, no cognitive issues.  No SI.  Continue acute ECT.

## 2022-08-10 NOTE — ASU DISCHARGE PLAN (ADULT/PEDIATRIC) - NS MD DC FALL RISK RISK
For information on Fall & Injury Prevention, visit: [email or web address]-[email or web address]-mobility OR  [email or web address]://[email or web address].[email or web address]/[email or web address]-[email or web address]-injury OR  https://www.cdc.gov/steadi/patient.html

## 2022-08-10 NOTE — ASU DISCHARGE PLAN (ADULT/PEDIATRIC) - ASU DC SPECIAL INSTRUCTIONSFT
AMBULATORY ELECTROCONVULSIVE THERAPY (ECT) DISCHARGE INSTRUCTIONS    Condition:  Stable for discharge to home.    1. Activities: Rest the day of your treatment.  It is normal to experience drowsiness and possibly some confusion following ECT and anesthesia.  DO NOT consume alcohol, operative machinery, drive or make important personal, business or legal decisions for at least 24 hours.    2. Diet: Begin with a light meal following ECT and progress as tolerated to a regular diet (ie. resume your normal food intake).    3. Medications: Resume medications as prescribed by your outpatient physicians.  Mild aches or headache may be experienced post treatment.  This is usually relieved  by Tylenol or other non-aspirin medications (take only amounts prescribed by each ).      4. Emergencies:  Please first call your regular outpatient psychiatric provider (ie. psychopharmacologist) for any changes in your psychiatric symptoms.   If you are unable to reach your doctor, you go directly to your local Emergency Room.      5. Routine Follow-Up Information:   Please call the ECT Department if you are unable to make your next appointment or have any questions about your ECT treatments.  You may also contact Adam Banks MD at [email or web address] or [phone] for non-urgent clinical questions about your treatment course.      REMEMBER TO NOT EAT ANYTHING FOR 8 HOURS BEFORE YOUR NEXT TREATMENT.   YOU MAY DRINK ONLY CLEAR LIQUIDS UP TO 3 HOURS BEFORE YOUR TREATMENT (eg. WATER, APPLE JUICE, GINGER ALE).    SOMEONE OVER 18 YEARS OLD MUST ACCOMPANY YOU HOME FROM THE HOSPITAL ON THE DAY OF YOUR TREATMENT.    IF YOU HAVE ANY QUESTIONS ABOUT YOUR NEXT TREATMENT, COVID TESTING, OR EXPIRATION DATES OF YOUR MEDICAL CLEARANCE, PLEASE CONTACT:     ECT Department:  Lakshmi Thomas” Angelina [phone] office; [phone] fax    Your next ECT treatment will be on: Friday 8/12/22 (acute)

## 2022-08-10 NOTE — ASU PREOP CHECKLIST - 1.
Fistula to left arm present  positive bruit and positive thrill noted  Dialysis Q Tues. , Thurs. , Sat.  last done yesterday 08/09/22.  , labs done today results viewed by Dr. Jolly

## 2022-08-11 NOTE — ASU PATIENT PROFILE, ADULT - ABILITY TO HEAR (WITH HEARING AID OR HEARING APPLIANCE IF NORMALLY USED):
SUBJECTIVE  Jermaine Hopkins is a 51 year old male presenting for follow-up of ROSALEE.  9 years ago he had an oral appliance made which he has been using since.  I saw him about a year ago.  Needed a new appliance but at that time he had been undergoing some dental implants.  He is completed those and now is interested in following up for a new appliance.  Sleep study completed at [facility] on 6/7/10.  Mild ROSALEE.  AHI-9.  However predominant events during REM sleep with REM AHI-30.  The oral appliance was very effective in eliminating daytime sleepiness and snoring.    Social History:  for [employer], promoted to [employer] recently  Social History     Tobacco Use   • Smoking status: Former Smoker     Types: Cigarettes     Start date:      Last attempt to quit: 1988     Years since quittin.4   • Smokeless tobacco: Never Used   Substance Use Topics   • Alcohol use: Not Currently     Frequency: Never     Patient Active Problem List   Diagnosis   • Alcohol dependence in early, early partial, sustained full, or sustained partial remission (CMS/HCC)   • Anxiety disorder   • GERD (gastroesophageal reflux disease)   • Dysthymic disorder   • Fatty liver   • Impaired fasting glucose   • Malignant tumor of gallbladder (CMS/HCC)   • Abdominal pain, right lower quadrant   • Chest pain   • Disorder of male genital organs   • Diverticulosis of colon   • Insomnia, unspecified   • Enthesopathy of hip region   • Thrombocytopenia (CMS/HCC)     Medications:  Current Outpatient Medications   Medication Sig Dispense Refill   • pantoprazole (PROTONIX) 40 MG tablet Take 1 tablet by mouth daily. 90 tablet 1   • ursodiol (ACTIGALL) 500 MG tablet Take 1 tablet by mouth daily. 30 tablet 0   • alfuzosin (UROXATRAL) 10 MG 24 hr tablet Take 1 tablet by mouth daily. 90 tablet 3   • triamcinolone (ARISTOSPAN) 0.1 % lotion Apply twice daily       No current facility-administered medications for  this visit.        OBJECTIVE:  Visit Vitals  BP 98/70   Pulse 61   Ht 6' (1.829 m)   Wt 97.5 kg (215 lb)   SpO2 96%   BMI 29.16 kg/m²         ASSESSMENT:  Documented ROSALEE reversed with oral appliance    PLAN:  Refer for a new oral appliance.      Electronically signed: Jorge Gamble MD     Adequate: hears normal conversation without difficulty

## 2022-08-11 NOTE — ASU PATIENT PROFILE, ADULT - NS PREOP UNDERSTANDS INFO
Instructions left on voice mail, Patient to arrive at 8am, no solid food after midnight, can have water or clear apple juice no pulp before 06:00 a.m. 08/12/22

## 2022-08-12 ENCOUNTER — TRANSCRIPTION ENCOUNTER (OUTPATIENT)
Age: 72
End: 2022-08-12

## 2022-08-12 ENCOUNTER — OUTPATIENT (OUTPATIENT)
Dept: OUTPATIENT SERVICES | Facility: HOSPITAL | Age: 72
LOS: 1 days | Discharge: HOME | End: 2022-08-12

## 2022-08-12 VITALS
WEIGHT: 108.03 LBS | DIASTOLIC BLOOD PRESSURE: 78 MMHG | RESPIRATION RATE: 16 BRPM | HEIGHT: 60 IN | SYSTOLIC BLOOD PRESSURE: 137 MMHG | TEMPERATURE: 98 F | HEART RATE: 55 BPM | OXYGEN SATURATION: 100 %

## 2022-08-12 VITALS
TEMPERATURE: 97 F | DIASTOLIC BLOOD PRESSURE: 72 MMHG | RESPIRATION RATE: 15 BRPM | HEART RATE: 60 BPM | OXYGEN SATURATION: 95 % | SYSTOLIC BLOOD PRESSURE: 136 MMHG

## 2022-08-12 DIAGNOSIS — Z98.890 OTHER SPECIFIED POSTPROCEDURAL STATES: Chronic | ICD-10-CM

## 2022-08-12 LAB
ALBUMIN SERPL ELPH-MCNC: 3.7 G/DL — SIGNIFICANT CHANGE UP (ref 3.3–5)
ALP SERPL-CCNC: 131 U/L — HIGH (ref 40–120)
ALT FLD-CCNC: 30 U/L — SIGNIFICANT CHANGE UP (ref 10–45)
ANION GAP SERPL CALC-SCNC: 15 MMOL/L — SIGNIFICANT CHANGE UP (ref 5–17)
AST SERPL-CCNC: 28 U/L — SIGNIFICANT CHANGE UP (ref 10–40)
BILIRUB SERPL-MCNC: 0.7 MG/DL — SIGNIFICANT CHANGE UP (ref 0.2–1.2)
BUN SERPL-MCNC: 31 MG/DL — HIGH (ref 7–23)
CALCIUM SERPL-MCNC: 10.3 MG/DL — SIGNIFICANT CHANGE UP (ref 8.4–10.5)
CHLORIDE SERPL-SCNC: 97 MMOL/L — SIGNIFICANT CHANGE UP (ref 96–108)
CO2 SERPL-SCNC: 31 MMOL/L — SIGNIFICANT CHANGE UP (ref 22–31)
CREAT SERPL-MCNC: 5 MG/DL — HIGH (ref 0.5–1.3)
EGFR: 9 ML/MIN/1.73M2 — LOW
GLUCOSE SERPL-MCNC: 96 MG/DL — SIGNIFICANT CHANGE UP (ref 70–99)
POTASSIUM SERPL-MCNC: 4.4 MMOL/L — SIGNIFICANT CHANGE UP (ref 3.5–5.3)
POTASSIUM SERPL-SCNC: 4.4 MMOL/L — SIGNIFICANT CHANGE UP (ref 3.5–5.3)
PROT SERPL-MCNC: 7 G/DL — SIGNIFICANT CHANGE UP (ref 6–8.3)
SODIUM SERPL-SCNC: 143 MMOL/L — SIGNIFICANT CHANGE UP (ref 135–145)

## 2022-08-12 PROCEDURE — 90870 ELECTROCONVULSIVE THERAPY: CPT

## 2022-08-12 RX ORDER — ACETAMINOPHEN 500 MG
650 TABLET ORAL ONCE
Refills: 0 | Status: DISCONTINUED | OUTPATIENT
Start: 2022-08-12 | End: 2022-08-12

## 2022-08-12 RX ORDER — ONDANSETRON 8 MG/1
4 TABLET, FILM COATED ORAL ONCE
Refills: 0 | Status: DISCONTINUED | OUTPATIENT
Start: 2022-08-12 | End: 2022-08-12

## 2022-08-12 RX ORDER — SODIUM CHLORIDE 9 MG/ML
250 INJECTION INTRAMUSCULAR; INTRAVENOUS; SUBCUTANEOUS
Refills: 0 | Status: DISCONTINUED | OUTPATIENT
Start: 2022-08-12 | End: 2022-08-12

## 2022-08-12 NOTE — ECT TREATMENT NOTE - NSECTCOMMENTS_PSY_ALL_CORE
Mood "same, not too much energy"  Affect appears brighter.  Talked about option of going to [facility] on Sundays instead of just staying home all day.   No SI.  No cognitive complaints.   Will continue acute ECT.

## 2022-08-12 NOTE — ASU DISCHARGE PLAN (ADULT/PEDIATRIC) - NS MD DC FALL RISK RISK
For information on Fall & Injury Prevention, visit: [email or web address].[facility]/[email or web address]-protects-[email or web address]-[email or web address]-[email or web address]-mobility OR  https://www.[facility]/[email or web address]-prevention-tips-to-avoid-injury OR  https://www.cdc.gov/steadi/patient.html

## 2022-08-12 NOTE — ASU DISCHARGE PLAN (ADULT/PEDIATRIC) - ASU DC SPECIAL INSTRUCTIONSFT
AMBULATORY ELECTROCONVULSIVE THERAPY (ECT) DISCHARGE INSTRUCTIONS    Condition:  Stable for discharge to home.    1. Activities: Rest the day of your treatment.  It is normal to experience drowsiness and possibly some confusion following ECT and anesthesia.  DO NOT consume alcohol, operative machinery, drive or make important personal, business or legal decisions for at least 24 hours.    2. Diet: Begin with a light meal following ECT and progress as tolerated to a regular diet (ie. resume your normal food intake).    3. Medications: Resume medications as prescribed by your outpatient physicians.  Mild aches or headache may be experienced post treatment.  This is usually relieved  by Tylenol or other non-aspirin medications (take only amounts prescribed by each ).      4. Emergencies:  Please first call your regular outpatient psychiatric provider (ie. psychopharmacologist) for any changes in your psychiatric symptoms.   If you are unable to reach your doctor, you go directly to your local Emergency Room.      5. Routine Follow-Up Information:   Please call the ECT Department if you are unable to make your next appointment or have any questions about your ECT treatments.  You may also contact Adam Banks MD at [email or web address] or [phone] for non-urgent clinical questions about your treatment course.      REMEMBER TO NOT EAT ANYTHING FOR 8 HOURS BEFORE YOUR NEXT TREATMENT.   YOU MAY DRINK ONLY CLEAR LIQUIDS UP TO 3 HOURS BEFORE YOUR TREATMENT (eg. WATER, APPLE JUICE, GINGER ALE).    SOMEONE OVER 18 YEARS OLD MUST ACCOMPANY YOU HOME FROM THE HOSPITAL ON THE DAY OF YOUR TREATMENT.    IF YOU HAVE ANY QUESTIONS ABOUT YOUR NEXT TREATMENT, COVID TESTING, OR EXPIRATION DATES OF YOUR MEDICAL CLEARANCE, PLEASE CONTACT:     ECT Department:  Lakshmi “Jaren” Angelina [phone] office; [phone] fax      Your next ECT treatment will be on:  Wed 8/17/22 (acute)

## 2022-08-12 NOTE — ECT TREATMENT NOTE - NSECTFOCPECOMPLET_PSY_ALL_CORE
Informed pt that since she does not know the name of the , cannot advise. Pt states that she will call the MD that prescribed the medication and see if it something she can take being pregnant. Pt will call us back with and inform us. Focused Physical Exam Completed

## 2022-08-16 NOTE — ASU PATIENT PROFILE, ADULT - NSICDXPASTSURGICALHX_GEN_ALL_CORE_FT
PAST SURGICAL HISTORY:  History of parathyroid surgery     S/P ECT (electroconvulsive therapy)      PAST SURGICAL HISTORY:  History of parathyroid surgery     S/P ECT (electroconvulsive therapy) X4

## 2022-08-16 NOTE — ASU PATIENT PROFILE, ADULT - FALL HARM RISK - UNIVERSAL INTERVENTIONS
Bed in lowest position, wheels locked, appropriate side rails in place/Call bell, personal items and telephone in reach/Instruct patient to call for assistance before getting out of bed or chair/Non-slip footwear when patient is out of bed/[place] to call system/Physically safe environment - no spills, clutter or unnecessary equipment/Purposeful Proactive Rounding/Room/bathroom lighting operational, light cord in reach

## 2022-08-16 NOTE — ASU PATIENT PROFILE, ADULT - NSICDXPASTMEDICALHX_GEN_ALL_CORE_FT
PAST MEDICAL HISTORY:  Acid reflux     Acute kidney failure diayalsis 3 x week    Anxiety and depression     HTN (hypertension)      PAST MEDICAL HISTORY:  Acid reflux     Acute kidney failure diayalsis 3 x week ( [facility] )    Anxiety and depression     HTN (hypertension)

## 2022-08-17 ENCOUNTER — OUTPATIENT (OUTPATIENT)
Dept: OUTPATIENT SERVICES | Facility: HOSPITAL | Age: 72
LOS: 1 days | Discharge: HOME | End: 2022-08-17

## 2022-08-17 ENCOUNTER — TRANSCRIPTION ENCOUNTER (OUTPATIENT)
Age: 72
End: 2022-08-17

## 2022-08-17 VITALS
OXYGEN SATURATION: 97 % | RESPIRATION RATE: 14 BRPM | TEMPERATURE: 99 F | HEART RATE: 55 BPM | SYSTOLIC BLOOD PRESSURE: 144 MMHG | DIASTOLIC BLOOD PRESSURE: 68 MMHG

## 2022-08-17 VITALS
TEMPERATURE: 98 F | WEIGHT: 108.03 LBS | RESPIRATION RATE: 16 BRPM | HEART RATE: 58 BPM | OXYGEN SATURATION: 98 % | HEIGHT: 60 IN | DIASTOLIC BLOOD PRESSURE: 59 MMHG | SYSTOLIC BLOOD PRESSURE: 129 MMHG

## 2022-08-17 DIAGNOSIS — Z98.890 OTHER SPECIFIED POSTPROCEDURAL STATES: Chronic | ICD-10-CM

## 2022-08-17 LAB
ALBUMIN SERPL ELPH-MCNC: 3.8 G/DL — SIGNIFICANT CHANGE UP (ref 3.3–5)
ALP SERPL-CCNC: 134 U/L — HIGH (ref 40–120)
ALT FLD-CCNC: 29 U/L — SIGNIFICANT CHANGE UP (ref 10–45)
ANION GAP SERPL CALC-SCNC: 13 MMOL/L — SIGNIFICANT CHANGE UP (ref 5–17)
AST SERPL-CCNC: 29 U/L — SIGNIFICANT CHANGE UP (ref 10–40)
BILIRUB SERPL-MCNC: 0.7 MG/DL — SIGNIFICANT CHANGE UP (ref 0.2–1.2)
BUN SERPL-MCNC: 35 MG/DL — HIGH (ref 7–23)
CALCIUM SERPL-MCNC: 10.4 MG/DL — SIGNIFICANT CHANGE UP (ref 8.4–10.5)
CHLORIDE SERPL-SCNC: 96 MMOL/L — SIGNIFICANT CHANGE UP (ref 96–108)
CO2 SERPL-SCNC: 30 MMOL/L — SIGNIFICANT CHANGE UP (ref 22–31)
CREAT SERPL-MCNC: 5.5 MG/DL — HIGH (ref 0.5–1.3)
EGFR: 8 ML/MIN/1.73M2 — LOW
GLUCOSE SERPL-MCNC: 90 MG/DL — SIGNIFICANT CHANGE UP (ref 70–99)
POTASSIUM SERPL-MCNC: 4.7 MMOL/L — SIGNIFICANT CHANGE UP (ref 3.5–5.3)
POTASSIUM SERPL-SCNC: 4.7 MMOL/L — SIGNIFICANT CHANGE UP (ref 3.5–5.3)
PROT SERPL-MCNC: 7.2 G/DL — SIGNIFICANT CHANGE UP (ref 6–8.3)
SODIUM SERPL-SCNC: 139 MMOL/L — SIGNIFICANT CHANGE UP (ref 135–145)

## 2022-08-17 PROCEDURE — 90870 ELECTROCONVULSIVE THERAPY: CPT

## 2022-08-17 RX ORDER — SODIUM CHLORIDE 9 MG/ML
250 INJECTION INTRAMUSCULAR; INTRAVENOUS; SUBCUTANEOUS
Refills: 0 | Status: DISCONTINUED | OUTPATIENT
Start: 2022-08-17 | End: 2022-08-17

## 2022-08-17 RX ORDER — ACETAMINOPHEN 500 MG
2 TABLET ORAL
Qty: 0 | Refills: 0 | DISCHARGE
Start: 2022-08-17

## 2022-08-17 RX ORDER — ACETAMINOPHEN 500 MG
650 TABLET ORAL ONCE
Refills: 0 | Status: DISCONTINUED | OUTPATIENT
Start: 2022-08-17 | End: 2022-08-17

## 2022-08-17 RX ORDER — ONDANSETRON 8 MG/1
4 TABLET, FILM COATED ORAL ONCE
Refills: 0 | Status: DISCONTINUED | OUTPATIENT
Start: 2022-08-17 | End: 2022-08-17

## 2022-08-17 RX ADMIN — SODIUM CHLORIDE 40 MILLILITER(S): 9 INJECTION INTRAMUSCULAR; INTRAVENOUS; SUBCUTANEOUS at 11:47

## 2022-08-17 NOTE — ASU DISCHARGE PLAN (ADULT/PEDIATRIC) - NS MD DC FALL RISK RISK
For information on Fall & Injury Prevention, visit: [email or web address] OR  [email or web address].[email or web address]-[email or web address]-injury OR  https://www.cdc.gov/steadi/patient.html

## 2022-08-17 NOTE — ASU DISCHARGE PLAN (ADULT/PEDIATRIC) - ASU DC SPECIAL INSTRUCTIONSFT
AMBULATORY ELECTROCONVULSIVE THERAPY (ECT) DISCHARGE INSTRUCTIONS    Condition:  Stable for discharge to home.    1. Activities: Rest the day of your treatment.  It is normal to experience drowsiness and possibly some confusion following ECT and anesthesia.  DO NOT consume alcohol, operative machinery, drive or make important personal, business or legal decisions for at least 24 hours.    2. Diet: Begin with a light meal following ECT and progress as tolerated to a regular diet (ie. resume your normal food intake).    3. Medications: Resume medications as prescribed by your outpatient physicians.  Mild aches or headache may be experienced post treatment.  This is usually relieved  by Tylenol or other non-aspirin medications (take only amounts prescribed by each ).      4. Emergencies:  Please first call your regular outpatient psychiatric provider (ie. psychopharmacologist) for any changes in your psychiatric symptoms.   If you are unable to reach your doctor, you go directly to your local Emergency Room.      5. Routine Follow-Up Information:   Please call the ECT Department if you are unable to make your next appointment or have any questions about your ECT treatments.  You may also contact Adam Banks MD at [email or web address] or [phone] for non-urgent clinical questions about your treatment course.      REMEMBER TO NOT EAT ANYTHING FOR 8 HOURS BEFORE YOUR NEXT TREATMENT.   YOU MAY DRINK ONLY CLEAR LIQUIDS UP TO 3 HOURS BEFORE YOUR TREATMENT (eg. WATER, APPLE JUICE, GINGER ALE).    SOMEONE OVER 18 YEARS OLD MUST ACCOMPANY YOU HOME FROM THE HOSPITAL ON THE DAY OF YOUR TREATMENT.    IF YOU HAVE ANY QUESTIONS ABOUT YOUR NEXT TREATMENT, COVID TESTING, OR EXPIRATION DATES OF YOUR MEDICAL CLEARANCE, PLEASE CONTACT:     ECT Department:  Lakshmi Thomas” Angelina [phone] office; [phone] fax      Your next ECT treatment will be on:  Friday 8/19/22 (acute)

## 2022-08-17 NOTE — ECT TREATMENT NOTE - NSECTCOMMENTS_PSY_ALL_CORE
Mood "maybe a little better". Feels overall less weighed down with life and appts.   Still fatigue on days of treatment (which are days after dialysis).  Appetite improving, sleep good.  Will continue acute ECT for now until symptoms further stabilize.  AOx3, cognition stable.  No SI.   BDI= Mood "maybe a little better". Feels overall less weighed down with life and appts.   Still fatigue on days of treatment (which are days after dialysis).  Appetite improving, sleep good.  Will continue acute ECT for now until symptoms further stabilize.  AOx3, cognition stable.  No SI.   BDI=22

## 2022-08-18 NOTE — ASU PATIENT PROFILE, ADULT - NSICDXPASTMEDICALHX_GEN_ALL_CORE_FT
PAST MEDICAL HISTORY:  Acid reflux     Acute kidney failure diayalsis 3 x week ( [facility] )    Anxiety and depression     HTN (hypertension)

## 2022-08-18 NOTE — ASU PATIENT PROFILE, ADULT - NSICDXPASTSURGICALHX_GEN_ALL_CORE_FT
shortness of breath PAST SURGICAL HISTORY:  History of parathyroid surgery     S/P ECT (electroconvulsive therapy) X4

## 2022-08-18 NOTE — ASU PATIENT PROFILE, ADULT - FALL HARM RISK - UNIVERSAL INTERVENTIONS
Bed in lowest position, wheels locked, appropriate side rails in place/Call bell, personal items and telephone in reach/Instruct patient to call for assistance before getting out of bed or chair/Non-slip footwear when patient is out of bed/[place] to call system/Physically safe environment - no spills, clutter or unnecessary equipment/Purposeful Proactive Rounding/Room/bathroom lighting operational, light cord in reach

## 2022-08-19 ENCOUNTER — TRANSCRIPTION ENCOUNTER (OUTPATIENT)
Age: 72
End: 2022-08-19

## 2022-08-19 ENCOUNTER — OUTPATIENT (OUTPATIENT)
Dept: OUTPATIENT SERVICES | Facility: HOSPITAL | Age: 72
LOS: 1 days | Discharge: HOME | End: 2022-08-19

## 2022-08-19 VITALS
OXYGEN SATURATION: 98 % | DIASTOLIC BLOOD PRESSURE: 66 MMHG | RESPIRATION RATE: 16 BRPM | WEIGHT: 108.03 LBS | HEART RATE: 62 BPM | TEMPERATURE: 98 F | HEIGHT: 60 IN | SYSTOLIC BLOOD PRESSURE: 132 MMHG

## 2022-08-19 VITALS
HEART RATE: 62 BPM | RESPIRATION RATE: 15 BRPM | SYSTOLIC BLOOD PRESSURE: 142 MMHG | DIASTOLIC BLOOD PRESSURE: 68 MMHG | OXYGEN SATURATION: 96 %

## 2022-08-19 DIAGNOSIS — Z98.890 OTHER SPECIFIED POSTPROCEDURAL STATES: Chronic | ICD-10-CM

## 2022-08-19 LAB
ALBUMIN SERPL ELPH-MCNC: 3.7 G/DL — SIGNIFICANT CHANGE UP (ref 3.3–5)
ALP SERPL-CCNC: 135 U/L — HIGH (ref 40–120)
ALT FLD-CCNC: 26 U/L — SIGNIFICANT CHANGE UP (ref 10–45)
ANION GAP SERPL CALC-SCNC: 11 MMOL/L — SIGNIFICANT CHANGE UP (ref 5–17)
AST SERPL-CCNC: 23 U/L — SIGNIFICANT CHANGE UP (ref 10–40)
BILIRUB SERPL-MCNC: 0.6 MG/DL — SIGNIFICANT CHANGE UP (ref 0.2–1.2)
BUN SERPL-MCNC: 68 MG/DL — HIGH (ref 7–23)
CALCIUM SERPL-MCNC: 10.5 MG/DL — SIGNIFICANT CHANGE UP (ref 8.4–10.5)
CHLORIDE SERPL-SCNC: 103 MMOL/L — SIGNIFICANT CHANGE UP (ref 96–108)
CO2 SERPL-SCNC: 27 MMOL/L — SIGNIFICANT CHANGE UP (ref 22–31)
CREAT SERPL-MCNC: 7.8 MG/DL — HIGH (ref 0.5–1.3)
EGFR: 5 ML/MIN/1.73M2 — LOW
GLUCOSE SERPL-MCNC: 94 MG/DL — SIGNIFICANT CHANGE UP (ref 70–99)
ISTAT ARTERIAL GLUCOSE: 96 MG/DL — SIGNIFICANT CHANGE UP (ref 70–99)
ISTAT ARTERIAL HEMATOCRIT: 35 % — SIGNIFICANT CHANGE UP (ref 34.5–45)
ISTAT ARTERIAL HEMOGLOBIN: 11.9 G/DL — SIGNIFICANT CHANGE UP (ref 11.5–15.5)
ISTAT ARTERIAL IONIZED CALCIUM: 1.32 MMOL/L — HIGH (ref 1.12–1.3)
ISTAT ARTERIAL PCO2: 45 MMHG — SIGNIFICANT CHANGE UP (ref 35–45)
ISTAT ARTERIAL PH: 7.33 — LOW (ref 7.35–7.45)
ISTAT ARTERIAL PO2: <66 MMHG — CRITICAL LOW (ref 80–105)
ISTAT ARTERIAL POTASSIUM: 5.1 MMOL/L — SIGNIFICANT CHANGE UP (ref 3.5–5.3)
ISTAT ARTERIAL SODIUM: 138 MMOL/L — SIGNIFICANT CHANGE UP (ref 135–145)
ISTAT ARTERIAL TCO2: 25 MMOL/L — SIGNIFICANT CHANGE UP (ref 22–31)
POTASSIUM SERPL-MCNC: 5.5 MMOL/L — HIGH (ref 3.5–5.3)
POTASSIUM SERPL-SCNC: 5.5 MMOL/L — HIGH (ref 3.5–5.3)
PROT SERPL-MCNC: 7.1 G/DL — SIGNIFICANT CHANGE UP (ref 6–8.3)
SODIUM SERPL-SCNC: 141 MMOL/L — SIGNIFICANT CHANGE UP (ref 135–145)

## 2022-08-19 PROCEDURE — 90870 ELECTROCONVULSIVE THERAPY: CPT

## 2022-08-19 RX ORDER — ONDANSETRON 8 MG/1
4 TABLET, FILM COATED ORAL ONCE
Refills: 0 | Status: DISCONTINUED | OUTPATIENT
Start: 2022-08-19 | End: 2022-08-19

## 2022-08-19 RX ORDER — ACETAMINOPHEN 500 MG
650 TABLET ORAL ONCE
Refills: 0 | Status: DISCONTINUED | OUTPATIENT
Start: 2022-08-19 | End: 2022-08-19

## 2022-08-19 NOTE — ASU DISCHARGE PLAN (ADULT/PEDIATRIC) - ASU DISCHARGE DATE/TIME
"[facility] PROGRESS NOTE        Patient: Autumn St Date: 2021   : 1948 Attending: Clayton Ott MD   ? ? Admission date: 2021  ICU admit date: 2021    24hr Events: No acute overnight events, having urinary retention    Subjective:   Autumn St is a 67year old male presenting with Carotid stenosis, right [I65.21]     Objective:   BP (!) 155/54   Pulse 75   Temp 98.5 Â°F (36.9 Â°C) (Temporal)   Resp 19   Ht 5' 8"" (1.727 m)   Wt 89 kg (196 lb 3.4 oz)   BMI 29.83 kg/mÂ²   BSA 2.03 mÂ²          Physical Exam:  Physical Exam  Vitals and nursing note reviewed. Constitutional:       Appearance: Normal appearance. HENT:      Head: Normocephalic. Eyes:      Pupils: Pupils are equal, round, and reactive to light. Neck:      Comments: R neck incision CDI, drain removed  Cardiovascular:      Rate and Rhythm: Normal rate and regular rhythm. Pulses: Normal pulses. Pulmonary:      Effort: Pulmonary effort is normal.      Breath sounds: Normal breath sounds. Abdominal:      General: Bowel sounds are normal.      Palpations: Abdomen is soft. Musculoskeletal:         General: Normal range of motion. Skin:     General: Skin is warm and dry. Neurological:      General: No focal deficit present.       Mental Status: He is alert.          ============================================================    INTAKE/OUTPUT DATA  Date 21 - 21 0659 21 - 21 0659   Shift 9542-6855 2034-6160 5552-5301 24 Hour Total 4467-5090 1207-7413 2152-4742 24 Hour Total   INTAKE   P.O.  240  240       I.V. 123.3 638 636.4 1397.7       Shift Total 123.3 878 636.4 1637.7       OUTPUT   Urine 650        Drains 50 60  110         Output (ml) (Drain 21 #1 Right Neck Bulb (e.g. ERNA, Davol, etc)) 50 60  110       Shift Total 700        Weight (kg) 89 89 89 89 89 89 89 89        CURRENT MEDICATIONS  â¢ niCARdipine (CARDENE) 40 mg/200 mL in NaCl " infusion        â¢ ascorbic acid  250 mg Oral Daily   â¢ lisinopril  20 mg Oral Daily   â¢ rosuvastatin  20 mg Oral Nightly   â¢ acetaminophen  500 mg Oral 4 times per day   â¢ aspirin  325 mg Oral Daily   â¢ influenza virus quadrivalent vaccine inactivated injection  0.5 mL Intramuscular Once   â¢ pneumococcal 23-valent vaccine  0.5 mL Intramuscular Once   â¢ metoPROLOL succinate  50 mg Oral Daily   â¢ Potassium Standard Replacement Protocol   Does not apply See Admin Instructions   â¢ Magnesium Standard Replacement Protocol   Does not apply See Admin Instructions   â¢ Phosphorus Standard Replacement Protocol   Does not apply See Admin Instructions          Pertinent Reviewed:  Allergies, Medications, Labs, Imaging, and Physician and Nursing Notes    LABORATORY STUDIES  Recent Labs     11/23/21  1521 11/24/21  0352   WBC 6.4 6.1   RBC 3.14* 3.12*   HGB 9.2* 9.3*   HCT 28.6* 28.9*    167     Recent Labs     11/23/21  1521 11/24/21  0352   SODIUM 138 137   POTASSIUM 4.3 3.9   CHLORIDE 109* 108*   CO2 22 23   BUN 14 15   CREATININE 0.68 0.66*   MG 1.8  --      Recent Labs     11/23/21  0625   INR 1.1       IMAGING:  None today     LAST MICRO:  Microbiology Results     None            ASSESSMENT/PLAN  POD 1 R CEA  Hx CAD, Afib, HTN, HLD and aortic ulcer  Hemodynamically stable on no vasoactive medications  Resume home antihypertensives/statin this am  Anticoagulation when cleared by vascular  Tolerating diet  Pain well controlled  OOB as tolerated  PT OT    H COPD  Ex smoker  Nebs PRN  Aggressive pulm toilet  Oxygenating well    Urinary retention  Straight cath overnight  Urology consult if unable to void this am    PPX: SCD  DISPO: home today if able to void      Critical care time 34 min    Monty Lira MD  Anesthesia Critical Care  11/24/21 10:29 AM 19-Aug-2022 12:55

## 2022-08-19 NOTE — ASU DISCHARGE PLAN (ADULT/PEDIATRIC) - ASU DC SPECIAL INSTRUCTIONSFT
AMBULATORY ELECTROCONVULSIVE THERAPY (ECT) DISCHARGE INSTRUCTIONS    Condition:  Stable for discharge to home.    1. Activities: Rest the day of your treatment.  It is normal to experience drowsiness and possibly some confusion following ECT and anesthesia.  DO NOT consume alcohol, operative machinery, drive or make important personal, business or legal decisions for at least 24 hours.    2. Diet: Begin with a light meal following ECT and progress as tolerated to a regular diet (ie. resume your normal food intake).    3. Medications: Resume medications as prescribed by your outpatient physicians.  Mild aches or headache may be experienced post treatment.  This is usually relieved  by Tylenol or other non-aspirin medications (take only amounts prescribed by each ).      4. Emergencies:  Please first call your regular outpatient psychiatric provider (ie. psychopharmacologist) for any changes in your psychiatric symptoms.   If you are unable to reach your doctor, you go directly to your local Emergency Room.      5. Routine Follow-Up Information:   Please call the ECT Department if you are unable to make your next appointment or have any questions about your ECT treatments.  You may also contact Adam Banks MD at [email or web address] or [phone] for non-urgent clinical questions about your treatment course.      REMEMBER TO NOT EAT ANYTHING FOR 8 HOURS BEFORE YOUR NEXT TREATMENT.   YOU MAY DRINK ONLY CLEAR LIQUIDS UP TO 3 HOURS BEFORE YOUR TREATMENT (eg. WATER, APPLE JUICE, GINGER ALE).    SOMEONE OVER 18 YEARS OLD MUST ACCOMPANY YOU HOME FROM THE HOSPITAL ON THE DAY OF YOUR TREATMENT.    IF YOU HAVE ANY QUESTIONS ABOUT YOUR NEXT TREATMENT, COVID TESTING, OR EXPIRATION DATES OF YOUR MEDICAL CLEARANCE, PLEASE CONTACT:     ECT Department:  Lakshmi “Jaren” Angelina [phone] office; [phone] fax    Your next ECT treatment will be on: Wed 8/24/22 (acute)

## 2022-08-19 NOTE — ECT TREATMENT NOTE - NSECTCOMMENTS_PSY_ALL_CORE
K preECT 5.5.  Discussed with anesthesia who agree pt is stable for ECT.  She will have HD tomorrow.  Mood "same" as Wed.  Improved but still with low energy/motivation.  Encouraged to try to be more active this weekend.  Will continue acute ECT for now until symptoms further stabilize.  AOx3, cognition stable.  No SI.   BDI=26 Didn't go to HD yesterday.  K preECT 5.5.  No SOB or other symptoms of fluid overload.  Discussed with anesthesia who agree pt is stable for ECT.  She will have HD tomorrow.  Mood "same" as Wed.  Improved but still with low energy/motivation.  Encouraged to try to be more active this weekend.  Will continue acute ECT for now until symptoms further stabilize.  AOx3, cognition stable.  No SI.   BDI=26

## 2022-08-19 NOTE — ECT TREATMENT NOTE - NSECTOUTPT_PSY_ALL_CORE
Called as a new pt courtesy call - left message. Told patient to have new pt paperwork completely filled out, insurance card, and id and to arrive on time to appointment. If they didn't have the paperwork filled out and arrive on time may be rescheduled. Also stated if they didn't receive paperwork to let us know so we could get it to them another way. Left office number on message. Told to arrive @ 130 @ [place] [address] location. Yes

## 2022-08-22 ENCOUNTER — NON-APPOINTMENT (OUTPATIENT)
Age: 72
End: 2022-08-22

## 2022-08-22 ENCOUNTER — APPOINTMENT (OUTPATIENT)
Dept: INTERNAL MEDICINE | Facility: CLINIC | Age: 72
End: 2022-08-22

## 2022-08-22 VITALS
OXYGEN SATURATION: 96 % | HEART RATE: 68 BPM | HEIGHT: 60 IN | BODY MASS INDEX: 21.79 KG/M2 | DIASTOLIC BLOOD PRESSURE: 85 MMHG | SYSTOLIC BLOOD PRESSURE: 151 MMHG | TEMPERATURE: 99.3 F | WEIGHT: 111 LBS

## 2022-08-22 PROCEDURE — 36415 COLL VENOUS BLD VENIPUNCTURE: CPT | Mod: GC

## 2022-08-22 PROCEDURE — 93000 ELECTROCARDIOGRAM COMPLETE: CPT

## 2022-08-22 PROCEDURE — 99214 OFFICE O/P EST MOD 30 MIN: CPT | Mod: 25,GC

## 2022-08-23 RX ORDER — FAMOTIDINE 10 MG/ML
1 INJECTION INTRAVENOUS
Qty: 0 | Refills: 0 | DISCHARGE

## 2022-08-23 NOTE — ASU PATIENT PROFILE, ADULT - FALL HARM RISK - UNIVERSAL INTERVENTIONS
Bed in lowest position, wheels locked, appropriate side rails in place/Call bell, personal items and telephone in reach/Instruct patient to call for assistance before getting out of bed or chair/Non-slip footwear when patient is out of bed/[place] to call system/Physically safe environment - no spills, clutter or unnecessary equipment/Purposeful Proactive Rounding/Room/bathroom lighting operational, light cord in reach

## 2022-08-23 NOTE — ASU PATIENT PROFILE, ADULT - NSICDXPASTSURGICALHX_GEN_ALL_CORE_FT
PAST SURGICAL HISTORY:  History of parathyroid surgery     S/P ECT (electroconvulsive therapy) X4

## 2022-08-24 ENCOUNTER — OUTPATIENT (OUTPATIENT)
Dept: OUTPATIENT SERVICES | Facility: HOSPITAL | Age: 72
LOS: 1 days | Discharge: HOME | End: 2022-08-24

## 2022-08-24 ENCOUNTER — TRANSCRIPTION ENCOUNTER (OUTPATIENT)
Age: 72
End: 2022-08-24

## 2022-08-24 VITALS
HEART RATE: 69 BPM | TEMPERATURE: 97 F | DIASTOLIC BLOOD PRESSURE: 71 MMHG | HEIGHT: 60 IN | RESPIRATION RATE: 16 BRPM | WEIGHT: 109.57 LBS | SYSTOLIC BLOOD PRESSURE: 142 MMHG | OXYGEN SATURATION: 98 %

## 2022-08-24 VITALS
HEART RATE: 59 BPM | OXYGEN SATURATION: 99 % | RESPIRATION RATE: 15 BRPM | TEMPERATURE: 98 F | DIASTOLIC BLOOD PRESSURE: 66 MMHG | SYSTOLIC BLOOD PRESSURE: 131 MMHG

## 2022-08-24 DIAGNOSIS — Z98.890 OTHER SPECIFIED POSTPROCEDURAL STATES: Chronic | ICD-10-CM

## 2022-08-24 LAB
ALBUMIN SERPL ELPH-MCNC: 3.7 G/DL — SIGNIFICANT CHANGE UP (ref 3.3–5)
ALP SERPL-CCNC: 135 U/L — HIGH (ref 40–120)
ALT FLD-CCNC: 24 U/L — SIGNIFICANT CHANGE UP (ref 10–45)
ANION GAP SERPL CALC-SCNC: 17 MMOL/L — SIGNIFICANT CHANGE UP (ref 5–17)
AST SERPL-CCNC: 24 U/L — SIGNIFICANT CHANGE UP (ref 10–40)
BILIRUB SERPL-MCNC: 0.7 MG/DL — SIGNIFICANT CHANGE UP (ref 0.2–1.2)
BUN SERPL-MCNC: 49 MG/DL — HIGH (ref 7–23)
CALCIUM SERPL-MCNC: 9.8 MG/DL — SIGNIFICANT CHANGE UP (ref 8.4–10.5)
CHLORIDE SERPL-SCNC: 96 MMOL/L — SIGNIFICANT CHANGE UP (ref 96–108)
CO2 SERPL-SCNC: 28 MMOL/L — SIGNIFICANT CHANGE UP (ref 22–31)
CREAT SERPL-MCNC: 6.7 MG/DL — HIGH (ref 0.5–1.3)
EGFR: 6 ML/MIN/1.73M2 — LOW
GLUCOSE SERPL-MCNC: 95 MG/DL — SIGNIFICANT CHANGE UP (ref 70–99)
POTASSIUM SERPL-MCNC: 4.7 MMOL/L — SIGNIFICANT CHANGE UP (ref 3.5–5.3)
POTASSIUM SERPL-SCNC: 4.7 MMOL/L — SIGNIFICANT CHANGE UP (ref 3.5–5.3)
PROT SERPL-MCNC: 7.4 G/DL — SIGNIFICANT CHANGE UP (ref 6–8.3)
SODIUM SERPL-SCNC: 141 MMOL/L — SIGNIFICANT CHANGE UP (ref 135–145)

## 2022-08-24 PROCEDURE — 90870 ELECTROCONVULSIVE THERAPY: CPT

## 2022-08-24 RX ORDER — SODIUM CHLORIDE 9 MG/ML
500 INJECTION, SOLUTION INTRAVENOUS
Refills: 0 | Status: DISCONTINUED | OUTPATIENT
Start: 2022-08-24 | End: 2022-08-24

## 2022-08-24 RX ADMIN — SODIUM CHLORIDE 40 MILLILITER(S): 9 INJECTION, SOLUTION INTRAVENOUS at 09:55

## 2022-08-24 NOTE — ASU DISCHARGE PLAN (ADULT/PEDIATRIC) - ASU DC SPECIAL INSTRUCTIONSFT
AMBULATORY ELECTROCONVULSIVE THERAPY (ECT) DISCHARGE INSTRUCTIONS    Condition:  Stable for discharge to home.    1. Activities: Rest the day of your treatment.  It is normal to experience drowsiness and possibly some confusion following ECT and anesthesia.  DO NOT consume alcohol, operative machinery, drive or make important personal, business or legal decisions for at least 24 hours.    2. Diet: Begin with a light meal following ECT and progress as tolerated to a regular diet (ie. resume your normal food intake).    3. Medications: Resume medications as prescribed by your outpatient physicians.  Mild aches or headache may be experienced post treatment.  This is usually relieved  by Tylenol or other non-aspirin medications (take only amounts prescribed by each ).      4. Emergencies:  Please first call your regular outpatient psychiatric provider (ie. psychopharmacologist) for any changes in your psychiatric symptoms.   If you are unable to reach your doctor, you go directly to your local Emergency Room.      5. Routine Follow-Up Information:   Please call the ECT Department if you are unable to make your next appointment or have any questions about your ECT treatments.  You may also contact Adam Banks MD at [email or web address] or [phone] for non-urgent clinical questions about your treatment course.      REMEMBER TO NOT EAT ANYTHING FOR 8 HOURS BEFORE YOUR NEXT TREATMENT.   YOU MAY DRINK ONLY CLEAR LIQUIDS UP TO 3 HOURS BEFORE YOUR TREATMENT (eg. WATER, APPLE JUICE, GINGER ALE).    SOMEONE OVER 18 YEARS OLD MUST ACCOMPANY YOU HOME FROM THE HOSPITAL ON THE DAY OF YOUR TREATMENT.    IF YOU HAVE ANY QUESTIONS ABOUT YOUR NEXT TREATMENT, COVID TESTING, OR EXPIRATION DATES OF YOUR MEDICAL CLEARANCE, PLEASE CONTACT:     ECT Department:  Lakshmi Thomas” Angelina [phone] office; [phone] fax      Your next ECT treatment will be on: Friday 8/26/22

## 2022-08-24 NOTE — ECT TREATMENT NOTE - NSECTCOMMENTS_PSY_ALL_CORE
Went to HD yesterday.  Mood "ok". Still with lower energy than baseline (complicated by HD schedule).   BDI improving.  Agrees to continue acute ECT until she reaches plateau.  Cognition stable.  No SI.   BDI=17

## 2022-08-24 NOTE — ECT TREATMENT NOTE - NS_RISKASSESSMENTINTER_PSY_ALL_CORE
[Disease: _____________________] : Disease: [unfilled] [T: ___] : T[unfilled] [N: ___] : N[unfilled] [AJCC Stage: ____] : AJCC Stage: [unfilled] [de-identified] : Age 57: left breast cancer \par Screening mammogram on 12/16/2018 showed focal asymmetry in the posterior central lateral left breast and additional imaging showing 7 mm spiculated mass at the left 5:00 area. She had left breast biopsy which showed invasive mammary carcinoma with lobular features ER >90%, WA 24.5%, Mvo0sto negative and LCIS. MRI of the breast showed biopsy proven nonmass enhancement along with left breast 4:00 enhancement and 9:00 enhancement. She had MRI guided biopsy of the L 4:00 area that showed mild proliferative fibrocystic changes and 9:00 area that showed LCIS and fibrocystic changes. On 3/27/19, she underwent R prophylactic mastectomy with SLN biopsy and L mastectomy and sentinel lymph node biopsy with Dr Disla. The pathology showed R breast LCIS and L left mastectomy showed invasive lobular carcinoma and extensive LCIS classic type with extensive ductal extension and negative sentinel lymph nodes. OncotypeDx score was 10. She had MEEK flap reconstruction with Dr Mcclain. She had issues with diarrhea and hair loss with anastrozole and was switched to exemestane. She developed worsening arthralgias from the exemestane: unable to open and abduct her thumbs only improved by cortisone injection. She started trial of letrozole on 5/2020.   [de-identified] : invasive lobular carcinoma ER >90%, ME 24.5%, Svl6xjo negative  [de-identified] : exemestane 7/26/19 to 1/2020 stopped due to arthralgias\par Invitae 11/13/19 \par letrozole 5/2020 to present  [de-identified] : She has new job and working with [employer]: less stressful than previous job. She is doing hybrid: part home and part office. She has noticed for the past 2 months headaches daily: feels pressure in the back of the eyes. No sinus drainage or fevers. No vision changes. The headaches are associated with dry heaves. Denies any new medications or changes in health. No focal weakness of the hands or feet. Has baseline joint stiffness and arthralgias without change. She is taking letrozole. No new chest wall changes. Saw hepatology for fatty liver and has been on diet to lose weight.  Low Acute Suicide Risk

## 2022-08-24 NOTE — ASU DISCHARGE PLAN (ADULT/PEDIATRIC) - NS MD DC FALL RISK RISK
For information on Fall & Injury Prevention, visit: [email or web address] [place]/[email or web address]-[email or web address]-maintains-health-[email or web address]-mobility OR  [email or web address]://www.[email or web address] [place]/news/[email or web address]-tips-to-avoid-injury OR  https://www.cdc.gov/steadi/patient.html

## 2022-08-25 NOTE — ASU PATIENT PROFILE, ADULT - NSICDXPASTMEDICALHX_GEN_ALL_CORE_FT
PAST MEDICAL HISTORY:  Acid reflux     Acute kidney failure diayalsis 3 x week ( T-Th-S )    Anxiety and depression     HTN (hypertension)      PAST MEDICAL HISTORY:  Acid reflux     Acute kidney failure diayalsis 3 x week ( T-Th-S ) last done 8/25/22    Anxiety and depression     HTN (hypertension)

## 2022-08-25 NOTE — ASU PATIENT PROFILE, ADULT - FALL HARM RISK - UNIVERSAL INTERVENTIONS
Bed in lowest position, wheels locked, appropriate side rails in place/Call bell, personal items and telephone in reach/Instruct patient to call for assistance before getting out of bed or chair/Non-slip footwear when patient is out of bed/[place] to call system/Physically safe environment - no spills, clutter or unnecessary equipment/Purposeful Proactive Rounding/Room/bathroom lighting operational, light cord in reach

## 2022-08-25 NOTE — ASU PATIENT PROFILE, ADULT - NSICDXPASTSURGICALHX_GEN_ALL_CORE_FT
PAST SURGICAL HISTORY:  History of parathyroid surgery     S/P ECT (electroconvulsive therapy) X4     PAST SURGICAL HISTORY:  History of parathyroid surgery     S/P ECT (electroconvulsive therapy) X5     PAST SURGICAL HISTORY:  Arteriovenous fistula of left upper extremity     History of parathyroid surgery     S/P ECT (electroconvulsive therapy) X5

## 2022-08-26 ENCOUNTER — TRANSCRIPTION ENCOUNTER (OUTPATIENT)
Age: 72
End: 2022-08-26

## 2022-08-26 ENCOUNTER — OUTPATIENT (OUTPATIENT)
Dept: OUTPATIENT SERVICES | Facility: HOSPITAL | Age: 72
LOS: 1 days | Discharge: HOME | End: 2022-08-26

## 2022-08-26 VITALS
HEART RATE: 67 BPM | OXYGEN SATURATION: 96 % | DIASTOLIC BLOOD PRESSURE: 80 MMHG | TEMPERATURE: 98 F | RESPIRATION RATE: 16 BRPM | HEIGHT: 60 IN | WEIGHT: 107.81 LBS | SYSTOLIC BLOOD PRESSURE: 152 MMHG

## 2022-08-26 VITALS
OXYGEN SATURATION: 95 % | RESPIRATION RATE: 14 BRPM | SYSTOLIC BLOOD PRESSURE: 140 MMHG | TEMPERATURE: 97 F | HEART RATE: 75 BPM | DIASTOLIC BLOOD PRESSURE: 80 MMHG

## 2022-08-26 DIAGNOSIS — I77.0 ARTERIOVENOUS FISTULA, ACQUIRED: Chronic | ICD-10-CM

## 2022-08-26 DIAGNOSIS — Z98.890 OTHER SPECIFIED POSTPROCEDURAL STATES: Chronic | ICD-10-CM

## 2022-08-26 LAB
ALBUMIN SERPL ELPH-MCNC: 3.8 G/DL — SIGNIFICANT CHANGE UP (ref 3.3–5)
ALP SERPL-CCNC: 144 U/L — HIGH (ref 40–120)
ALT FLD-CCNC: 23 U/L — SIGNIFICANT CHANGE UP (ref 10–45)
ANION GAP SERPL CALC-SCNC: 19 MMOL/L — HIGH (ref 5–17)
AST SERPL-CCNC: 25 U/L — SIGNIFICANT CHANGE UP (ref 10–40)
BILIRUB SERPL-MCNC: 0.7 MG/DL — SIGNIFICANT CHANGE UP (ref 0.2–1.2)
BUN SERPL-MCNC: 40 MG/DL — HIGH (ref 7–23)
CALCIUM SERPL-MCNC: 9.8 MG/DL — SIGNIFICANT CHANGE UP (ref 8.4–10.5)
CHLORIDE SERPL-SCNC: 94 MMOL/L — LOW (ref 96–108)
CO2 SERPL-SCNC: 29 MMOL/L — SIGNIFICANT CHANGE UP (ref 22–31)
CREAT SERPL-MCNC: 5.8 MG/DL — HIGH (ref 0.5–1.3)
EGFR: 7 ML/MIN/1.73M2 — LOW
GLUCOSE SERPL-MCNC: 94 MG/DL — SIGNIFICANT CHANGE UP (ref 70–99)
POTASSIUM SERPL-MCNC: 5.1 MMOL/L — SIGNIFICANT CHANGE UP (ref 3.5–5.3)
POTASSIUM SERPL-SCNC: 5.1 MMOL/L — SIGNIFICANT CHANGE UP (ref 3.5–5.3)
PROT SERPL-MCNC: 7.8 G/DL — SIGNIFICANT CHANGE UP (ref 6–8.3)
SODIUM SERPL-SCNC: 142 MMOL/L — SIGNIFICANT CHANGE UP (ref 135–145)

## 2022-08-26 PROCEDURE — 90870 ELECTROCONVULSIVE THERAPY: CPT

## 2022-08-26 RX ORDER — SODIUM CHLORIDE 9 MG/ML
1000 INJECTION, SOLUTION INTRAVENOUS
Refills: 0 | Status: DISCONTINUED | OUTPATIENT
Start: 2022-08-26 | End: 2022-08-26

## 2022-08-26 RX ORDER — ACETAMINOPHEN 500 MG
650 TABLET ORAL ONCE
Refills: 0 | Status: DISCONTINUED | OUTPATIENT
Start: 2022-08-26 | End: 2022-08-26

## 2022-08-26 NOTE — ECT TREATMENT NOTE - NSECTCOMMENTS_PSY_ALL_CORE
"I wish I felt better than this, I want this to work better".  Agrees she is less down and has somewhat more energy than preECT but feels she isn't continuing to improve.  When challenged to do some activity this weekend, she replied "I'll try but I'm not sure I'll be able to".  AOx3, no SI.    Will inc energy today to see if a stronger stimulus can offer more antidepressant effect.

## 2022-08-26 NOTE — ASU DISCHARGE PLAN (ADULT/PEDIATRIC) - NS MD DC FALL RISK RISK
For information on Fall & Injury Prevention, visit: [email or web address]/news/fall-prevention-protects-and-maintains-health-and-mobility OR  https://www.[facility]/news/fall-prevention-tips-to-avoid-injury OR  https://www.cdc.gov/steadi/patient.html

## 2022-08-26 NOTE — ASU DISCHARGE PLAN (ADULT/PEDIATRIC) - ASU DC SPECIAL INSTRUCTIONSFT
AMBULATORY ELECTROCONVULSIVE THERAPY (ECT) DISCHARGE INSTRUCTIONS    Condition:  Stable for discharge to home.    1. Activities: Rest the day of your treatment.  It is normal to experience drowsiness and possibly some confusion following ECT and anesthesia.  DO NOT consume alcohol, operative machinery, drive or make important personal, business or legal decisions for at least 24 hours.    2. Diet: Begin with a light meal following ECT and progress as tolerated to a regular diet (ie. resume your normal food intake).    3. Medications: Resume medications as prescribed by your outpatient physicians.  Mild aches or headache may be experienced post treatment.  This is usually relieved  by Tylenol or other non-aspirin medications (take only amounts prescribed by each ).      4. Emergencies:  Please first call your regular outpatient psychiatric provider (ie. psychopharmacologist) for any changes in your psychiatric symptoms.   If you are unable to reach your doctor, you go directly to your local Emergency Room.      5. Routine Follow-Up Information:   Please call the ECT Department if you are unable to make your next appointment or have any questions about your ECT treatments.  You may also contact Adam Banks MD at [email or web address] or [phone] for non-urgent clinical questions about your treatment course.      REMEMBER TO NOT EAT ANYTHING FOR 8 HOURS BEFORE YOUR NEXT TREATMENT.   YOU MAY DRINK ONLY CLEAR LIQUIDS UP TO 3 HOURS BEFORE YOUR TREATMENT (eg. WATER, APPLE JUICE, GINGER ALE).    SOMEONE OVER 18 YEARS OLD MUST ACCOMPANY YOU HOME FROM THE HOSPITAL ON THE DAY OF YOUR TREATMENT.    IF YOU HAVE ANY QUESTIONS ABOUT YOUR NEXT TREATMENT, COVID TESTING, OR EXPIRATION DATES OF YOUR MEDICAL CLEARANCE, PLEASE CONTACT:     ECT Department:  Lakshmi Thomas” Angelina [phone] office; [phone] fax      Your next ECT treatment will be on: 8/31/22 Wednesday (acute)

## 2022-08-30 NOTE — ASU PATIENT PROFILE, ADULT - NSICDXPASTSURGICALHX_GEN_ALL_CORE_FT
PAST SURGICAL HISTORY:  Arteriovenous fistula of left upper extremity     History of parathyroid surgery     S/P ECT (electroconvulsive therapy) X5     PAST SURGICAL HISTORY:  Arteriovenous fistula of left upper extremity Pos. bruit , pos. thrill ( 08/31/22@ 9:02am)    History of parathyroid surgery     S/P ECT (electroconvulsive therapy) X5

## 2022-08-30 NOTE — ASU PATIENT PROFILE, ADULT - NSICDXPASTMEDICALHX_GEN_ALL_CORE_FT
PAST MEDICAL HISTORY:  Acid reflux     Acute kidney failure diayalsis 3 x week ( T-Th-S ) last done 8/25/22    Anxiety and depression     HTN (hypertension)      PAST MEDICAL HISTORY:  Acid reflux     Acute kidney failure diayalsis 3 x week ( T-Th-S ) last done 8/30 /22    Anxiety and depression     HTN (hypertension)

## 2022-08-31 ENCOUNTER — TRANSCRIPTION ENCOUNTER (OUTPATIENT)
Age: 72
End: 2022-08-31

## 2022-08-31 ENCOUNTER — OUTPATIENT (OUTPATIENT)
Dept: OUTPATIENT SERVICES | Facility: HOSPITAL | Age: 72
LOS: 1 days | Discharge: HOME | End: 2022-08-31

## 2022-08-31 VITALS
RESPIRATION RATE: 15 BRPM | DIASTOLIC BLOOD PRESSURE: 70 MMHG | HEIGHT: 60 IN | SYSTOLIC BLOOD PRESSURE: 138 MMHG | TEMPERATURE: 99 F | WEIGHT: 105.82 LBS | HEART RATE: 61 BPM | OXYGEN SATURATION: 97 %

## 2022-08-31 VITALS
OXYGEN SATURATION: 98 % | RESPIRATION RATE: 14 BRPM | DIASTOLIC BLOOD PRESSURE: 76 MMHG | SYSTOLIC BLOOD PRESSURE: 154 MMHG | HEART RATE: 68 BPM | TEMPERATURE: 99 F

## 2022-08-31 DIAGNOSIS — Z98.890 OTHER SPECIFIED POSTPROCEDURAL STATES: Chronic | ICD-10-CM

## 2022-08-31 DIAGNOSIS — I77.0 ARTERIOVENOUS FISTULA, ACQUIRED: Chronic | ICD-10-CM

## 2022-08-31 LAB
ALBUMIN SERPL ELPH-MCNC: 3.9 G/DL — SIGNIFICANT CHANGE UP (ref 3.3–5)
ALP SERPL-CCNC: 140 U/L — HIGH (ref 40–120)
ALT FLD-CCNC: 27 U/L — SIGNIFICANT CHANGE UP (ref 10–45)
ANION GAP SERPL CALC-SCNC: 13 MMOL/L — SIGNIFICANT CHANGE UP (ref 5–17)
AST SERPL-CCNC: 28 U/L — SIGNIFICANT CHANGE UP (ref 10–40)
BILIRUB SERPL-MCNC: 0.9 MG/DL — SIGNIFICANT CHANGE UP (ref 0.2–1.2)
BUN SERPL-MCNC: 42 MG/DL — HIGH (ref 7–23)
CALCIUM SERPL-MCNC: 10.4 MG/DL — SIGNIFICANT CHANGE UP (ref 8.4–10.5)
CHLORIDE SERPL-SCNC: 97 MMOL/L — SIGNIFICANT CHANGE UP (ref 96–108)
CO2 SERPL-SCNC: 30 MMOL/L — SIGNIFICANT CHANGE UP (ref 22–31)
CREAT SERPL-MCNC: 6.8 MG/DL — HIGH (ref 0.5–1.3)
EGFR: 6 ML/MIN/1.73M2 — LOW
GLUCOSE SERPL-MCNC: 100 MG/DL — HIGH (ref 70–99)
POTASSIUM SERPL-MCNC: 5.4 MMOL/L — HIGH (ref 3.5–5.3)
POTASSIUM SERPL-SCNC: 5.4 MMOL/L — HIGH (ref 3.5–5.3)
PROT SERPL-MCNC: 7.5 G/DL — SIGNIFICANT CHANGE UP (ref 6–8.3)
SODIUM SERPL-SCNC: 140 MMOL/L — SIGNIFICANT CHANGE UP (ref 135–145)

## 2022-08-31 PROCEDURE — 90870 ELECTROCONVULSIVE THERAPY: CPT

## 2022-08-31 RX ORDER — ACETAMINOPHEN 500 MG
650 TABLET ORAL ONCE
Refills: 0 | Status: DISCONTINUED | OUTPATIENT
Start: 2022-08-31 | End: 2022-08-31

## 2022-08-31 RX ORDER — SODIUM CHLORIDE 9 MG/ML
1000 INJECTION, SOLUTION INTRAVENOUS
Refills: 0 | Status: DISCONTINUED | OUTPATIENT
Start: 2022-08-31 | End: 2022-08-31

## 2022-08-31 RX ADMIN — SODIUM CHLORIDE 75 MILLILITER(S): 9 INJECTION, SOLUTION INTRAVENOUS at 10:17

## 2022-08-31 NOTE — ECT TREATMENT NOTE - NSECTCOMMENTS_PSY_ALL_CORE
Mood "about the same".  Still with lower energy.  Affect is blunted but reactive.  AOx3, no SI.  States prior to ECT, depression was about 7 out of 10 (10 worst), now about a 4/10.   Energy was inc at last visit to optimize antidepressant effect.  Continue acute ECT.  Mood "about the same".  Still with lower energy.  Affect is blunted but reactive.  AOx3, no SI.  States prior to ECT, depression was about 7 out of 10 (10 worst), now about a 5/10.   Energy was inc at last visit to optimize antidepressant effect.  Continue acute ECT.

## 2022-08-31 NOTE — ASU DISCHARGE PLAN (ADULT/PEDIATRIC) - NS MD DC FALL RISK RISK
For information on Fall & Injury Prevention, visit: [email or web address].[facility]/news/fall-prevention-protects-and-maintains-health-and-mobility OR  https://www.[facility]/news/fall-prevention-tips-to-avoid-injury OR  https://www.cdc.gov/steadi/patient.html

## 2022-08-31 NOTE — ASU DISCHARGE PLAN (ADULT/PEDIATRIC) - ASU DC SPECIAL INSTRUCTIONSFT
AMBULATORY ELECTROCONVULSIVE THERAPY (ECT) DISCHARGE INSTRUCTIONS    Condition:  Stable for discharge to home.    1. Activities: Rest the day of your treatment.  It is normal to experience drowsiness and possibly some confusion following ECT and anesthesia.  DO NOT consume alcohol, operative machinery, drive or make important personal, business or legal decisions for at least 24 hours.    2. Diet: Begin with a light meal following ECT and progress as tolerated to a regular diet (ie. resume your normal food intake).    3. Medications: Resume medications as prescribed by your outpatient physicians.  Mild aches or headache may be experienced post treatment.  This is usually relieved  by Tylenol or other non-aspirin medications (take only amounts prescribed by each ).      4. Emergencies:  Please first call your regular outpatient psychiatric provider (ie. psychopharmacologist) for any changes in your psychiatric symptoms.   If you are unable to reach your doctor, you go directly to your local Emergency Room.      5. Routine Follow-Up Information:   Please call the ECT Department if you are unable to make your next appointment or have any questions about your ECT treatments.  You may also contact Adam Banks MD at [email or web address] or [phone] for non-urgent clinical questions about your treatment course.      REMEMBER TO NOT EAT ANYTHING FOR 8 HOURS BEFORE YOUR NEXT TREATMENT.   YOU MAY DRINK ONLY CLEAR LIQUIDS UP TO 3 HOURS BEFORE YOUR TREATMENT (eg. WATER, APPLE JUICE, GINGER ALE).    SOMEONE OVER 18 YEARS OLD MUST ACCOMPANY YOU HOME FROM THE HOSPITAL ON THE DAY OF YOUR TREATMENT.    IF YOU HAVE ANY QUESTIONS ABOUT YOUR NEXT TREATMENT, COVID TESTING, OR EXPIRATION DATES OF YOUR MEDICAL CLEARANCE, PLEASE CONTACT:     ECT Department:  Lakshmi Thomas” Angelina [phone] office; [phone] fax    Your next ECT treatment will be on: Friday 9/2/22 (acute)

## 2022-09-01 NOTE — ASU PATIENT PROFILE, ADULT - FALL HARM RISK - UNIVERSAL INTERVENTIONS
Bed in lowest position, wheels locked, appropriate side rails in place/Call bell, personal items and telephone in reach/Instruct patient to call for assistance before getting out of bed or chair/Non-slip footwear when patient is out of bed/[place] to call system/Physically safe environment - no spills, clutter or unnecessary equipment/Purposeful Proactive Rounding/Room/bathroom lighting operational, light cord in reach

## 2022-09-01 NOTE — ASU PATIENT PROFILE, ADULT - NSICDXPASTMEDICALHX_GEN_ALL_CORE_FT
PAST MEDICAL HISTORY:  Acid reflux     Acute kidney failure diayalsis 3 x week ( T-Th-S ) last done 8/30 /22    Anxiety and depression     HTN (hypertension)

## 2022-09-01 NOTE — ASU PATIENT PROFILE, ADULT - NSICDXPASTSURGICALHX_GEN_ALL_CORE_FT
PAST SURGICAL HISTORY:  Arteriovenous fistula of left upper extremity Pos. bruit , pos. thrill ( 08/31/22@ 9:02am)    History of parathyroid surgery     S/P ECT (electroconvulsive therapy) X5

## 2022-09-02 ENCOUNTER — TRANSCRIPTION ENCOUNTER (OUTPATIENT)
Age: 72
End: 2022-09-02

## 2022-09-02 ENCOUNTER — OUTPATIENT (OUTPATIENT)
Dept: OUTPATIENT SERVICES | Facility: HOSPITAL | Age: 72
LOS: 1 days | Discharge: HOME | End: 2022-09-02

## 2022-09-02 VITALS
TEMPERATURE: 98 F | HEIGHT: 60 IN | OXYGEN SATURATION: 99 % | DIASTOLIC BLOOD PRESSURE: 58 MMHG | HEART RATE: 57 BPM | WEIGHT: 103.62 LBS | SYSTOLIC BLOOD PRESSURE: 121 MMHG

## 2022-09-02 VITALS
HEART RATE: 54 BPM | DIASTOLIC BLOOD PRESSURE: 60 MMHG | SYSTOLIC BLOOD PRESSURE: 125 MMHG | RESPIRATION RATE: 15 BRPM | OXYGEN SATURATION: 95 %

## 2022-09-02 DIAGNOSIS — I77.0 ARTERIOVENOUS FISTULA, ACQUIRED: Chronic | ICD-10-CM

## 2022-09-02 DIAGNOSIS — Z98.890 OTHER SPECIFIED POSTPROCEDURAL STATES: Chronic | ICD-10-CM

## 2022-09-02 LAB
ALBUMIN SERPL ELPH-MCNC: 4.1 G/DL — SIGNIFICANT CHANGE UP (ref 3.3–5)
ALP SERPL-CCNC: 138 U/L — HIGH (ref 40–120)
ALT FLD-CCNC: 25 U/L — SIGNIFICANT CHANGE UP (ref 10–45)
ANION GAP SERPL CALC-SCNC: 18 MMOL/L — HIGH (ref 5–17)
AST SERPL-CCNC: 29 U/L — SIGNIFICANT CHANGE UP (ref 10–40)
BILIRUB SERPL-MCNC: 0.8 MG/DL — SIGNIFICANT CHANGE UP (ref 0.2–1.2)
BUN SERPL-MCNC: 38 MG/DL — HIGH (ref 7–23)
CALCIUM SERPL-MCNC: 10.3 MG/DL — SIGNIFICANT CHANGE UP (ref 8.4–10.5)
CHLORIDE SERPL-SCNC: 94 MMOL/L — LOW (ref 96–108)
CO2 SERPL-SCNC: 28 MMOL/L — SIGNIFICANT CHANGE UP (ref 22–31)
CREAT SERPL-MCNC: 6.1 MG/DL — HIGH (ref 0.5–1.3)
EGFR: 7 ML/MIN/1.73M2 — LOW
GLUCOSE SERPL-MCNC: 101 MG/DL — HIGH (ref 70–99)
POTASSIUM SERPL-MCNC: 4.6 MMOL/L — SIGNIFICANT CHANGE UP (ref 3.5–5.3)
POTASSIUM SERPL-SCNC: 4.6 MMOL/L — SIGNIFICANT CHANGE UP (ref 3.5–5.3)
PROT SERPL-MCNC: 7.8 G/DL — SIGNIFICANT CHANGE UP (ref 6–8.3)
SODIUM SERPL-SCNC: 140 MMOL/L — SIGNIFICANT CHANGE UP (ref 135–145)

## 2022-09-02 PROCEDURE — 90870 ELECTROCONVULSIVE THERAPY: CPT

## 2022-09-02 RX ORDER — ACETAMINOPHEN 500 MG
650 TABLET ORAL ONCE
Refills: 0 | Status: DISCONTINUED | OUTPATIENT
Start: 2022-09-02 | End: 2022-09-02

## 2022-09-02 NOTE — ASU DISCHARGE PLAN (ADULT/PEDIATRIC) - NS MD DC FALL RISK RISK
For information on Fall & Injury Prevention, visit: https://www.[facility]/news/fall-prevention-protects-and-maintains-health-and-mobility OR  https://www.[facility]/news/fall-prevention-tips-to-avoid-injury OR  https://www.cdc.gov/steadi/patient.html

## 2022-09-02 NOTE — ASU PREOP CHECKLIST - 1.
pt had dialysis yesterday/ K4.6today pt had dialysis yesterday/ K4.6today/ fistula left arm/ bracelet applied

## 2022-09-02 NOTE — ECT TREATMENT NOTE - NSECTCOMMENTS_PSY_ALL_CORE
Mood "about the same".  Still with lower energy.  Affect is blunted but reactive.  AOx3, no SI.  States prior to ECT, depression was about 7 out of 10 (10 worst), now about a 5/10.   Energy was inc at last visit to optimize antidepressant effect.  Continue acute ECT.  Mood "maybe better".  Still low energy and motivation.  Unclear if fatigue is related to depression or ESRD/HD schedule.   Pt encouraged to be more active in free time.   Will continue acute ECT for now.  AOx3, no SI.   Cognition stable.

## 2022-09-06 DIAGNOSIS — F41.8 OTHER SPECIFIED ANXIETY DISORDERS: ICD-10-CM

## 2022-09-06 DIAGNOSIS — F31.4 BIPOLAR DISORDER, CURRENT EPISODE DEPRESSED, SEVERE, WITHOUT PSYCHOTIC FEATURES: ICD-10-CM

## 2022-09-06 DIAGNOSIS — I12.0 HYPERTENSIVE CHRONIC KIDNEY DISEASE WITH STAGE 5 CHRONIC KIDNEY DISEASE OR END STAGE RENAL DISEASE: ICD-10-CM

## 2022-09-06 DIAGNOSIS — K21.9 GASTRO-ESOPHAGEAL REFLUX DISEASE WITHOUT ESOPHAGITIS: ICD-10-CM

## 2022-09-06 DIAGNOSIS — N18.6 END STAGE RENAL DISEASE: ICD-10-CM

## 2022-09-06 DIAGNOSIS — Z99.2 DEPENDENCE ON RENAL DIALYSIS: ICD-10-CM

## 2022-09-06 NOTE — ASU PATIENT PROFILE, ADULT - FALL HARM RISK - RISK INTERVENTIONS

## 2022-09-06 NOTE — ASU PATIENT PROFILE, ADULT - NSICDXPASTMEDICALHX_GEN_ALL_CORE_FT
PAST MEDICAL HISTORY:  Acid reflux     Acute kidney failure diayalsis 3 x week ( T-Th-S ) last done 8/30 /22    Anxiety and depression     HTN (hypertension)      PAST MEDICAL HISTORY:  Acid reflux     Anxiety and depression     ESRD on dialysis left AV fistula + trill,  Tue-Thu-Sat, last done 9/6/22    HTN (hypertension)

## 2022-09-06 NOTE — ASU PATIENT PROFILE, ADULT - NSICDXPASTSURGICALHX_GEN_ALL_CORE_FT
PAST SURGICAL HISTORY:  Arteriovenous fistula of left upper extremity Pos. bruit , pos. thrill ( 08/31/22@ 9:02am)    History of parathyroid surgery     S/P ECT (electroconvulsive therapy) X5     PAST SURGICAL HISTORY:  Arteriovenous fistula of left upper extremity Pos. bruit , pos. thrill ( 08/31/22@ 9:02am)    History of parathyroid surgery     S/P ECT (electroconvulsive therapy)

## 2022-09-07 ENCOUNTER — TRANSCRIPTION ENCOUNTER (OUTPATIENT)
Age: 72
End: 2022-09-07

## 2022-09-07 ENCOUNTER — NON-APPOINTMENT (OUTPATIENT)
Age: 72
End: 2022-09-07

## 2022-09-07 ENCOUNTER — OUTPATIENT (OUTPATIENT)
Dept: OUTPATIENT SERVICES | Facility: HOSPITAL | Age: 72
LOS: 1 days | Discharge: HOME | End: 2022-09-07

## 2022-09-07 VITALS
RESPIRATION RATE: 14 BRPM | DIASTOLIC BLOOD PRESSURE: 58 MMHG | OXYGEN SATURATION: 100 % | HEART RATE: 60 BPM | TEMPERATURE: 97 F | SYSTOLIC BLOOD PRESSURE: 121 MMHG

## 2022-09-07 VITALS
RESPIRATION RATE: 16 BRPM | SYSTOLIC BLOOD PRESSURE: 107 MMHG | HEIGHT: 60 IN | WEIGHT: 105.38 LBS | HEART RATE: 66 BPM | OXYGEN SATURATION: 98 % | DIASTOLIC BLOOD PRESSURE: 82 MMHG | TEMPERATURE: 97 F

## 2022-09-07 DIAGNOSIS — Z98.890 OTHER SPECIFIED POSTPROCEDURAL STATES: Chronic | ICD-10-CM

## 2022-09-07 DIAGNOSIS — I77.0 ARTERIOVENOUS FISTULA, ACQUIRED: Chronic | ICD-10-CM

## 2022-09-07 LAB
ALBUMIN SERPL ELPH-MCNC: 4.2 G/DL — SIGNIFICANT CHANGE UP (ref 3.3–5)
ALBUMIN SERPL ELPH-MCNC: 5.2 G/DL
ALP BLD-CCNC: 165 U/L
ALP SERPL-CCNC: 143 U/L — HIGH (ref 40–120)
ALT FLD-CCNC: 22 U/L — SIGNIFICANT CHANGE UP (ref 10–45)
ALT SERPL-CCNC: 19 U/L
ANION GAP SERPL CALC-SCNC: 12 MMOL/L — SIGNIFICANT CHANGE UP (ref 5–17)
ANION GAP SERPL CALC-SCNC: 18 MMOL/L
AST SERPL-CCNC: 14 U/L
AST SERPL-CCNC: 27 U/L — SIGNIFICANT CHANGE UP (ref 10–40)
BASOPHILS # BLD AUTO: 0.04 K/UL
BASOPHILS NFR BLD AUTO: 0.8 %
BILIRUB SERPL-MCNC: 0.4 MG/DL
BILIRUB SERPL-MCNC: 0.8 MG/DL — SIGNIFICANT CHANGE UP (ref 0.2–1.2)
BUN SERPL-MCNC: 48 MG/DL — HIGH (ref 7–23)
BUN SERPL-MCNC: 73 MG/DL
CALCIUM SERPL-MCNC: 10 MG/DL
CALCIUM SERPL-MCNC: 9.9 MG/DL — SIGNIFICANT CHANGE UP (ref 8.4–10.5)
CHLORIDE SERPL-SCNC: 97 MMOL/L — SIGNIFICANT CHANGE UP (ref 96–108)
CHLORIDE SERPL-SCNC: 98 MMOL/L
CHOLEST SERPL-MCNC: 243 MG/DL
CO2 SERPL-SCNC: 23 MMOL/L
CO2 SERPL-SCNC: 26 MMOL/L — SIGNIFICANT CHANGE UP (ref 22–31)
CREAT SERPL-MCNC: 5.7 MG/DL — HIGH (ref 0.5–1.3)
CREAT SERPL-MCNC: 7.59 MG/DL
EGFR: 5 ML/MIN/1.73M2
EGFR: 7 ML/MIN/1.73M2 — LOW
EOSINOPHIL # BLD AUTO: 0.13 K/UL
EOSINOPHIL NFR BLD AUTO: 2.8 %
ESTIMATED AVERAGE GLUCOSE: 108 MG/DL
GLUCOSE SERPL-MCNC: 94 MG/DL — SIGNIFICANT CHANGE UP (ref 70–99)
GLUCOSE SERPL-MCNC: 97 MG/DL
HBA1C MFR BLD HPLC: 5.4 %
HCT VFR BLD CALC: 35.2 %
HDLC SERPL-MCNC: 65 MG/DL
HGB BLD-MCNC: 11.2 G/DL
IMM GRANULOCYTES NFR BLD AUTO: 0.4 %
LDLC SERPL CALC-MCNC: 115 MG/DL
LYMPHOCYTES # BLD AUTO: 1.29 K/UL
LYMPHOCYTES NFR BLD AUTO: 27.4 %
MAN DIFF?: NORMAL
MCHC RBC-ENTMCNC: 30.8 PG
MCHC RBC-ENTMCNC: 31.8 GM/DL
MCV RBC AUTO: 96.7 FL
MONOCYTES # BLD AUTO: 0.43 K/UL
MONOCYTES NFR BLD AUTO: 9.1 %
NEUTROPHILS # BLD AUTO: 2.8 K/UL
NEUTROPHILS NFR BLD AUTO: 59.5 %
NONHDLC SERPL-MCNC: 177 MG/DL
PLATELET # BLD AUTO: 174 K/UL
POTASSIUM SERPL-MCNC: 5 MMOL/L — SIGNIFICANT CHANGE UP (ref 3.5–5.3)
POTASSIUM SERPL-SCNC: 5 MMOL/L — SIGNIFICANT CHANGE UP (ref 3.5–5.3)
POTASSIUM SERPL-SCNC: 5.4 MMOL/L
PROT SERPL-MCNC: 7.3 G/DL
PROT SERPL-MCNC: 7.8 G/DL — SIGNIFICANT CHANGE UP (ref 6–8.3)
RBC # BLD: 3.64 M/UL
RBC # FLD: 14.9 %
SODIUM SERPL-SCNC: 135 MMOL/L — SIGNIFICANT CHANGE UP (ref 135–145)
SODIUM SERPL-SCNC: 138 MMOL/L
TRIGL SERPL-MCNC: 311 MG/DL
WBC # FLD AUTO: 4.71 K/UL

## 2022-09-07 PROCEDURE — 90870 ELECTROCONVULSIVE THERAPY: CPT

## 2022-09-07 RX ORDER — SODIUM CHLORIDE 9 MG/ML
250 INJECTION INTRAMUSCULAR; INTRAVENOUS; SUBCUTANEOUS
Refills: 0 | Status: DISCONTINUED | OUTPATIENT
Start: 2022-09-07 | End: 2022-09-07

## 2022-09-07 RX ORDER — ONDANSETRON 8 MG/1
4 TABLET, FILM COATED ORAL ONCE
Refills: 0 | Status: DISCONTINUED | OUTPATIENT
Start: 2022-09-07 | End: 2022-09-07

## 2022-09-07 NOTE — ADDENDUM
[FreeTextEntry1] : \par \par 9/2022: labs fine as she is on dialysis. can proceed with ECT procedure .

## 2022-09-07 NOTE — END OF VISIT
[FreeTextEntry3] : \par 70 yo f w/ h/o bipolar depression, esrd on hd , htn, here for preop for ect\par \par #preop- no cardiac history. legs weak chronic issue. no active complaints. get labs. has tolerated six ect sessiosn prior without issues. can walk five blocks- reasonable to proceed pending labs\par \par #leg weakness- likely from spinal issue. prior imaging records with stenosis but she is unsure of spine issue. discussed trial pt and hse dewclined. been going on for months. asked her to come back in 1-2 months to reassess again too\par \par #bipolar depression- improving with ect\par

## 2022-09-07 NOTE — HISTORY OF PRESENT ILLNESS
[Aortic Stenosis] : no aortic stenosis [Atrial Fibrillation] : no atrial fibrillation [Coronary Artery Disease] : no coronary artery disease [Recent Myocardial Infarction] : no recent myocardial infarction [Implantable Device/Pacemaker] : no implantable device/pacemaker [Asthma] : no asthma [COPD] : no COPD [Sleep Apnea] : no sleep apnea [Smoker] : not a smoker [Family Member] : no family member with adverse anesthesia reaction/sudden death [Self] : no previous adverse anesthesia reaction [Chronic Anticoagulation] : no chronic anticoagulation [Diabetes] : no diabetes [FreeTextEntry1] : ECT  [FreeTextEntry2] : 8/24 [FreeTextEntry3] : Dr. Adam Jolly  [FreeTextEntry4] : 72 y/o woman with PMH of major depression (s/p anti depressants, TMS and ketamine), HTN, CKD on HD, laryngopharyngeal reflux, presenting for ECT clearance. The patient reports feeling well, but is concerned about getting medical clearance for her procedure. s/p 6 session ECT since August 1st with no complications.\par She receives HD three times a week (S/T/T) last HD was Saturday 8/20, and does not report any issues. The etiology of her CKD is lithium use for more than 40 years. \par She denies any SOB, chest pain w/ or w/out excretion, she has normal bowel movements, and no issues with urination. She has had no history of seizures, TIAs/strokes, brain tumors or other neurosurgical/neurological conditions, and does not have any implanted devices.  \par She recently had a visit to her nephrologist where she received routine labs and ECG which were all normal.\par She recently had Pre-op clearance on August 1st with [facility]. \par  [FreeTextEntry8] : Pt reports leg weakness due to stayed in bed for a long time and she is unable to get around easily due to weakness

## 2022-09-07 NOTE — ASU DISCHARGE PLAN (ADULT/PEDIATRIC) - ASU DC SPECIAL INSTRUCTIONSFT
AMBULATORY ELECTROCONVULSIVE THERAPY (ECT) DISCHARGE INSTRUCTIONS    Condition:  Stable for discharge to home.    1. Activities: Rest the day of your treatment.  It is normal to experience drowsiness and possibly some confusion following ECT and anesthesia.  DO NOT consume alcohol, operative machinery, drive or make important personal, business or legal decisions for at least 24 hours.    2. Diet: Begin with a light meal following ECT and progress as tolerated to a regular diet (ie. resume your normal food intake).    3. Medications: Resume medications as prescribed by your outpatient physicians.  Mild aches or headache may be experienced post treatment.  This is usually relieved  by Tylenol or other non-aspirin medications (take only amounts prescribed by each ).      4. Emergencies:  Please first call your regular outpatient psychiatric provider (ie. psychopharmacologist) for any changes in your psychiatric symptoms.   If you are unable to reach your doctor, you go directly to your local Emergency Room.      5. Routine Follow-Up Information:   Please call the ECT Department if you are unable to make your next appointment or have any questions about your ECT treatments.  You may also contact Adam Banks MD at [email or web address] or [phone] for non-urgent clinical questions about your treatment course.      REMEMBER TO NOT EAT ANYTHING FOR 8 HOURS BEFORE YOUR NEXT TREATMENT.   YOU MAY DRINK ONLY CLEAR LIQUIDS UP TO 3 HOURS BEFORE YOUR TREATMENT (eg. WATER, APPLE JUICE, GINGER ALE).    SOMEONE OVER 18 YEARS OLD MUST ACCOMPANY YOU HOME FROM THE HOSPITAL ON THE DAY OF YOUR TREATMENT.    IF YOU HAVE ANY QUESTIONS ABOUT YOUR NEXT TREATMENT, COVID TESTING, OR EXPIRATION DATES OF YOUR MEDICAL CLEARANCE, PLEASE CONTACT:     ECT Department:  Lakshmi Thomas” [place] [phone] office; [phone] fax      Your next ECT treatment will be on: Friday 9/9/22 (acute)

## 2022-09-07 NOTE — ECT TREATMENT NOTE - NSECTCOMMENTS_PSY_ALL_CORE
Mood "better", less heavy.  Was able to go out for lunch over weekend and was disappointed a friend who she scheduled another meal with canceled on her.  AOx3, no SI.   Cognition stable.   Will continue acute up to 12 and consider taper to mECT if stable.  Mood "better", less heavy.  Was able to go out for lunch over weekend and was disappointed a friend who she scheduled another meal with canceled on her.  AOx3, no SI.   Cognition stable.  BDI=10, K = 5.0 today.  Will continue acute up to 12 and consider taper to mECT if stable.

## 2022-09-07 NOTE — ECT TREATMENT NOTE - NSELEVCHRSUICRISK_PSY_ALL_CORE
RX PROGRESS NOTE: Vancomycin Therapeutic Drug Monitoring    Day of therapy: 3    Indication and target trough: febrile neutropenia (15-20 mcg/mL)    Current vancomycin dosing regimen: 1000 mg IVPB every 8 hours    Most recent height and weight information:  Weight: 77.9 kg (10/28/17 0341)  Height: 5' 9\" (175.3 cm) (10/26/17 1800)    The Following are the Calculated  Current Weights for Dmitriy Freitas     Adjusted Ideal        73.6 kg 70.7 kg            Labs:  Serum Creatinine and Creatinine Clearance:  Serum creatinine: 0.53 mg/dL Low 10/27/17 1054  Estimated creatinine clearance: 159.3 mL/min    Vancomycin Serum Concentrations:  VANCOMYCIN (TROUGH) (mcg/mL)   Date/Time Value   10/28/2017 0850 5.7 (L)       Assessment:  Serum concentration of 5.7 after the 4th maintenance dose.   Based on the serum concentration, will adjust regimen to 1000 mg IVPB every 6 hours.    Additional serum concentrations may be necessary depending on pathogen identified, risk factors for adverse events, and/or duration of therapy.  Pharmacy will continue to follow and adjust as needed.    Thank you,    Bertha Shukla [facility]  10/28/2017 10:11 AM  Contact # [phone]     No

## 2022-09-07 NOTE — ASSESSMENT
[FreeTextEntry4] : 70 y/o woman with PMH of major depression (s/p anti depressants, TMS and ketamine), HTN, CKD on HD, laryngopharyngeal reflux, presenting for ECT clearance, s/p 6 session ECT since August 1st with no complications.\par \par #Pre-op clearance\par EKG 7/22 NSR, no cardiac history, tolerated 6 session ECT well since August 3rd with no complications.\par -Can walk 5 blocks with no CP, SOB\par -Leg weakness \par F/u CBC, CMP today\par RCRI total score 1, Class II, risk of post-op cardiac complication 6 (all with labs from 8/19)\par -Pending RCRI with new CMP 8/22\par \par \par # Leg weakness\par Prior imaging shows spinal stenosis. Its been going on in past couple months. No neuro deficit. \par She refused PT \par -Monitor symptoms in next visit, if leg weakness getting worse will consider PT vs neurology \par \par #Major depression\par Improved on ECT \par \par \par I spent 29 minutes time on all encounter.

## 2022-09-07 NOTE — ASU DISCHARGE PLAN (ADULT/PEDIATRIC) - NS MD DC FALL RISK RISK
For information on Fall & Injury Prevention, visit: [email or web address] OR  [email or web address]/[email or web address] OR  https://www.cdc.gov/steadi/patient.html

## 2022-09-08 NOTE — ASU PATIENT PROFILE, ADULT - NSICDXPASTMEDICALHX_GEN_ALL_CORE_FT
PAST MEDICAL HISTORY:  Acid reflux     Anxiety and depression     ESRD on dialysis left AV fistula + trill,  Tue-Thu-Sat, last done 9/6/22    HTN (hypertension)

## 2022-09-08 NOTE — ASU PATIENT PROFILE, ADULT - FALL HARM RISK - RISK INTERVENTIONS

## 2022-09-08 NOTE — ASU PATIENT PROFILE, ADULT - NS PRO ABUSE SCREEN SUSPICION NEGLECT YN
Instructions (Optional): If no improvement Detail Level: Simple Scheduled For Follow Up In (Optional): 1 month no

## 2022-09-08 NOTE — ASU PATIENT PROFILE, ADULT - NS PREOP UNDERSTANDS INFO
Spoke to patient, [id number], bring ID photo, insurance and vaccination cards, escort arranged. telephone and address given to patient/yes

## 2022-09-08 NOTE — ASU PATIENT PROFILE, ADULT - NSICDXPASTSURGICALHX_GEN_ALL_CORE_FT
PAST SURGICAL HISTORY:  Arteriovenous fistula of left upper extremity Pos. bruit , pos. thrill ( 08/31/22@ 9:02am)    History of parathyroid surgery     S/P ECT (electroconvulsive therapy)

## 2022-09-09 ENCOUNTER — OUTPATIENT (OUTPATIENT)
Dept: OUTPATIENT SERVICES | Facility: HOSPITAL | Age: 72
LOS: 1 days | Discharge: HOME | End: 2022-09-09

## 2022-09-09 ENCOUNTER — TRANSCRIPTION ENCOUNTER (OUTPATIENT)
Age: 72
End: 2022-09-09

## 2022-09-09 VITALS
RESPIRATION RATE: 14 BRPM | DIASTOLIC BLOOD PRESSURE: 65 MMHG | OXYGEN SATURATION: 98 % | HEART RATE: 60 BPM | SYSTOLIC BLOOD PRESSURE: 142 MMHG | TEMPERATURE: 97 F

## 2022-09-09 VITALS
DIASTOLIC BLOOD PRESSURE: 56 MMHG | SYSTOLIC BLOOD PRESSURE: 126 MMHG | WEIGHT: 106.26 LBS | RESPIRATION RATE: 16 BRPM | HEART RATE: 67 BPM | TEMPERATURE: 98 F | HEIGHT: 60 IN | OXYGEN SATURATION: 99 %

## 2022-09-09 DIAGNOSIS — I77.0 ARTERIOVENOUS FISTULA, ACQUIRED: Chronic | ICD-10-CM

## 2022-09-09 DIAGNOSIS — Z98.890 OTHER SPECIFIED POSTPROCEDURAL STATES: Chronic | ICD-10-CM

## 2022-09-09 LAB
ALBUMIN SERPL ELPH-MCNC: 3.7 G/DL — SIGNIFICANT CHANGE UP (ref 3.3–5)
ALP SERPL-CCNC: 133 U/L — HIGH (ref 40–120)
ALT FLD-CCNC: 25 U/L — SIGNIFICANT CHANGE UP (ref 10–45)
ANION GAP SERPL CALC-SCNC: 19 MMOL/L — HIGH (ref 5–17)
AST SERPL-CCNC: 28 U/L — SIGNIFICANT CHANGE UP (ref 10–40)
BILIRUB SERPL-MCNC: 0.7 MG/DL — SIGNIFICANT CHANGE UP (ref 0.2–1.2)
BUN SERPL-MCNC: 41 MG/DL — HIGH (ref 7–23)
CALCIUM SERPL-MCNC: 9.9 MG/DL — SIGNIFICANT CHANGE UP (ref 8.4–10.5)
CHLORIDE SERPL-SCNC: 97 MMOL/L — SIGNIFICANT CHANGE UP (ref 96–108)
CO2 SERPL-SCNC: 28 MMOL/L — SIGNIFICANT CHANGE UP (ref 22–31)
CREAT SERPL-MCNC: 5.8 MG/DL — HIGH (ref 0.5–1.3)
EGFR: 7 ML/MIN/1.73M2 — LOW
GLUCOSE SERPL-MCNC: 99 MG/DL — SIGNIFICANT CHANGE UP (ref 70–99)
POTASSIUM SERPL-MCNC: 4.4 MMOL/L — SIGNIFICANT CHANGE UP (ref 3.5–5.3)
POTASSIUM SERPL-SCNC: 4.4 MMOL/L — SIGNIFICANT CHANGE UP (ref 3.5–5.3)
PROT SERPL-MCNC: 7.2 G/DL — SIGNIFICANT CHANGE UP (ref 6–8.3)
SODIUM SERPL-SCNC: 144 MMOL/L — SIGNIFICANT CHANGE UP (ref 135–145)

## 2022-09-09 PROCEDURE — 90870 ELECTROCONVULSIVE THERAPY: CPT

## 2022-09-09 RX ORDER — ACETAMINOPHEN 500 MG
1000 TABLET ORAL ONCE
Refills: 0 | Status: DISCONTINUED | OUTPATIENT
Start: 2022-09-09 | End: 2022-09-23

## 2022-09-09 RX ORDER — SODIUM CHLORIDE 9 MG/ML
1000 INJECTION, SOLUTION INTRAVENOUS
Refills: 0 | Status: DISCONTINUED | OUTPATIENT
Start: 2022-09-09 | End: 2022-09-23

## 2022-09-09 NOTE — ASU PREOP CHECKLIST - AS TEMP SITE
Pt called stating that he would like to schedule his procedure     Pt can be reached at [phone] forehead

## 2022-09-09 NOTE — ASU DISCHARGE PLAN (ADULT/PEDIATRIC) - ASU DC SPECIAL INSTRUCTIONSFT
AMBULATORY ELECTROCONVULSIVE THERAPY (ECT) DISCHARGE INSTRUCTIONS    Condition:  Stable for discharge to home.    1. Activities: Rest the day of your treatment.  It is normal to experience drowsiness and possibly some confusion following ECT and anesthesia.  DO NOT consume alcohol, operative machinery, drive or make important personal, business or legal decisions for at least 24 hours.    2. Diet: Begin with a light meal following ECT and progress as tolerated to a regular diet (ie. resume your normal food intake).    3. Medications: Resume medications as prescribed by your outpatient physicians.  Mild aches or headache may be experienced post treatment.  This is usually relieved  by Tylenol or other non-aspirin medications (take only amounts prescribed by each ).      4. Emergencies:  Please first call your regular outpatient psychiatric provider (ie. psychopharmacologist) for any changes in your psychiatric symptoms.   If you are unable to reach your doctor, you go directly to your local Emergency Room.      5. Routine Follow-Up Information:   Please call the ECT Department if you are unable to make your next appointment or have any questions about your ECT treatments.  You may also contact Adam Banks MD at [email or web address] or [phone] for non-urgent clinical questions about your treatment course.      REMEMBER TO NOT EAT ANYTHING FOR 8 HOURS BEFORE YOUR NEXT TREATMENT.   YOU MAY DRINK ONLY CLEAR LIQUIDS UP TO 3 HOURS BEFORE YOUR TREATMENT (eg. WATER, APPLE JUICE, GINGER ALE).    SOMEONE OVER 18 YEARS OLD MUST ACCOMPANY YOU HOME FROM THE HOSPITAL ON THE DAY OF YOUR TREATMENT.    IF YOU HAVE ANY QUESTIONS ABOUT YOUR NEXT TREATMENT, COVID TESTING, OR EXPIRATION DATES OF YOUR MEDICAL CLEARANCE, PLEASE CONTACT:     ECT Department:  Lakshmi Thomas” [place] [phone] office; [phone] fax      Your next ECT treatment will be on: Friday 9/16/22 (weekly)

## 2022-09-09 NOTE — ECT TREATMENT NOTE - NSECTCOMMENTS_PSY_ALL_CORE
Mood "better, it worked".  More energetic.  Made plans to see friends over weekend.  "Maybe" mild cog SE but not interfering with daily activities.  Agrees with plan to try taper to weekly and switch officially to maintenance ECT next week vs returning to 2x/week if any relapse.  Pt agrees with plan.

## 2022-09-09 NOTE — ASU DISCHARGE PLAN (ADULT/PEDIATRIC) - NS MD DC FALL RISK RISK
For information on Fall & Injury Prevention, visit: [email or web address] OR  [email or web address] OR  https://www.cdc.gov/steadi/patient.html

## 2022-09-09 NOTE — ASU PREOP CHECKLIST - HEIGHT IN CM
152.4

## 2022-09-12 ENCOUNTER — APPOINTMENT (OUTPATIENT)
Dept: INTERNAL MEDICINE | Facility: CLINIC | Age: 72
End: 2022-09-12

## 2022-09-12 DIAGNOSIS — Z99.2 DEPENDENCE ON RENAL DIALYSIS: ICD-10-CM

## 2022-09-12 DIAGNOSIS — K21.9 GASTRO-ESOPHAGEAL REFLUX DISEASE WITHOUT ESOPHAGITIS: ICD-10-CM

## 2022-09-12 DIAGNOSIS — F31.4 BIPOLAR DISORDER, CURRENT EPISODE DEPRESSED, SEVERE, WITHOUT PSYCHOTIC FEATURES: ICD-10-CM

## 2022-09-12 DIAGNOSIS — I12.0 HYPERTENSIVE CHRONIC KIDNEY DISEASE WITH STAGE 5 CHRONIC KIDNEY DISEASE OR END STAGE RENAL DISEASE: ICD-10-CM

## 2022-09-12 DIAGNOSIS — N18.6 END STAGE RENAL DISEASE: ICD-10-CM

## 2022-09-13 DIAGNOSIS — Z13.220 ENCOUNTER FOR SCREENING FOR LIPOID DISORDERS: ICD-10-CM

## 2022-09-13 DIAGNOSIS — Z13.1 ENCOUNTER FOR SCREENING FOR DIABETES MELLITUS: ICD-10-CM

## 2022-09-21 ENCOUNTER — APPOINTMENT (OUTPATIENT)
Dept: INTERNAL MEDICINE | Facility: CLINIC | Age: 72
End: 2022-09-21

## 2022-09-26 ENCOUNTER — APPOINTMENT (OUTPATIENT)
Dept: INTERNAL MEDICINE | Facility: CLINIC | Age: 72
End: 2022-09-26

## 2022-09-26 VITALS
DIASTOLIC BLOOD PRESSURE: 70 MMHG | BODY MASS INDEX: 21.79 KG/M2 | SYSTOLIC BLOOD PRESSURE: 115 MMHG | WEIGHT: 111 LBS | OXYGEN SATURATION: 97 % | HEART RATE: 64 BPM | TEMPERATURE: 98.4 F | HEIGHT: 60 IN | RESPIRATION RATE: 14 BRPM

## 2022-09-26 DIAGNOSIS — K52.9 NONINFECTIVE GASTROENTERITIS AND COLITIS, UNSPECIFIED: ICD-10-CM

## 2022-09-26 DIAGNOSIS — K59.00 CONSTIPATION, UNSPECIFIED: ICD-10-CM

## 2022-09-26 PROCEDURE — 99214 OFFICE O/P EST MOD 30 MIN: CPT | Mod: 25,GC

## 2022-09-26 PROCEDURE — 36415 COLL VENOUS BLD VENIPUNCTURE: CPT

## 2022-09-26 NOTE — PHYSICAL EXAM
[Normal] : normal gait, coordination grossly intact, no focal deficits and deep tendon reflexes were 2+ and symmetric [Speech Grossly Normal] : speech grossly normal [Memory Grossly Normal] : memory grossly normal [Alert and Oriented x3] : oriented to person, place, and time [Normal Insight/Judgement] : insight and judgment were intact

## 2022-09-27 LAB
ANION GAP SERPL CALC-SCNC: 21 MMOL/L
BASOPHILS # BLD AUTO: 0.04 K/UL
BASOPHILS NFR BLD AUTO: 0.8 %
BUN SERPL-MCNC: 47 MG/DL
CALCIUM SERPL-MCNC: 8.9 MG/DL
CHLORIDE SERPL-SCNC: 94 MMOL/L
CO2 SERPL-SCNC: 21 MMOL/L
CREAT SERPL-MCNC: 6.88 MG/DL
EGFR: 6 ML/MIN/1.73M2
EOSINOPHIL # BLD AUTO: 0.13 K/UL
EOSINOPHIL NFR BLD AUTO: 2.7 %
GLUCOSE SERPL-MCNC: 81 MG/DL
HCT VFR BLD CALC: 31 %
HGB BLD-MCNC: 9.9 G/DL
IMM GRANULOCYTES NFR BLD AUTO: 0.4 %
LYMPHOCYTES # BLD AUTO: 0.84 K/UL
LYMPHOCYTES NFR BLD AUTO: 17.7 %
MAN DIFF?: NORMAL
MCHC RBC-ENTMCNC: 31 PG
MCHC RBC-ENTMCNC: 31.9 GM/DL
MCV RBC AUTO: 97.2 FL
MONOCYTES # BLD AUTO: 0.36 K/UL
MONOCYTES NFR BLD AUTO: 7.6 %
NEUTROPHILS # BLD AUTO: 3.36 K/UL
NEUTROPHILS NFR BLD AUTO: 70.8 %
PLATELET # BLD AUTO: 214 K/UL
POTASSIUM SERPL-SCNC: 4.1 MMOL/L
RBC # BLD: 3.19 M/UL
RBC # FLD: 15.5 %
SODIUM SERPL-SCNC: 135 MMOL/L
WBC # FLD AUTO: 4.75 K/UL

## 2022-09-27 NOTE — END OF VISIT
[] : Resident [FreeTextEntry3] : Here for preop for ECT \par RCRI 1, Class II risk, tolerated prior sessions with no issues, METS>4, can perform ADLs independently \par CBC and BMP today \par HTN- cw home regimen \par

## 2022-09-27 NOTE — HEALTH RISK ASSESSMENT
[2] : 2) Feeling down, depressed, or hopeless for more than half of the days (2) [PHQ-9 Positive] : PHQ-9 Positive [[id number]] : 4

## 2022-09-27 NOTE — HISTORY OF PRESENT ILLNESS
[de-identified] : 70 y/o woman with PMH of major depression (s/p anti depressants, TMS and ketamine), HTN, CKD on HD (d/t chornic Lithum use HD 3x/wk T/Th/S last HD was Saturday 9/24, laryngopharyngeal reflux, presenting for ECT clearance, s/p 9 session ECT with no complications.\par Pt reports that she went to [facility] ED for stomach pain and constipation Wednesday 9/21 found to have colitis discharged from ED with Augmentin. Presently, stomach pain and constipation has improved and is now have regular BMs on miralax. Pt also reports that her depression is a tad better. Pt is scheduled for ECT Friday 9/30, pt does not like ECT but understands that it is a necessary process.\par She denies any SOB, chest pain w/ or w/out excretion, she has normal bowel movements, and no issues with urination. She has had no history of seizures, TIAs/strokes, brain tumors or other neurosurgical/neurological conditions, and does not have any implanted devices. \par She recently had Pre-op clearance on August 8/22st at [facility] for ECT. \par

## 2022-09-27 NOTE — ASSESSMENT
[FreeTextEntry1] : 70 y/o woman with PMH of major depression (s/p anti depressants, TMS and ketamine), HTN, CKD on HD, laryngopharyngeal reflux, presenting for ECT clearance, s/p 9 session ECT with no complications.\par \par #Pre-op clearance\par EKG 7/22 NSR, no cardiac history, tolerated 9 session ECT well with no complications.\par mets>4 -Can walk at least 5 blocks up to 10 blocks with no CP, SOB, can walk a flight of stairs and grocercy shop\par RCRI total score 1, Class II, risk of post-op cardiac complication 6 (all with labs from 8/22)\par - f/u repeat CBC and BMP \par \par #HTN\par c/w with home regimen Nifedipine 90mg, losartan 100mg per nephrologist \par \par #Colitis\par - dc'd from [facility] 9/21 on Augmentin same day, no having regular BM on miralax and cholase \par - complete course of Augmentin received from ED  \par \par #Constipation\par c/w bowel regimen above\par \par RTC 3 months routine medical follow up

## 2022-09-29 NOTE — ASU PATIENT PROFILE, ADULT - NSICDXPASTMEDICALHX_GEN_ALL_CORE_FT
PAST MEDICAL HISTORY:  Acid reflux     Anxiety and depression     ESRD on dialysis left AV fistula + trill,  Tue-Thu-Sat, last done 9/6/22    HTN (hypertension)      PAST MEDICAL HISTORY:  Acid reflux     Anxiety and depression     ESRD on dialysis left AV fistula + trill,  Tue-Thu-Sat, most recent dialysis 9/29/2022    HTN (hypertension)

## 2022-09-29 NOTE — ASU PATIENT PROFILE, ADULT - FALL HARM RISK - RISK INTERVENTIONS

## 2022-09-29 NOTE — ASU PATIENT PROFILE, ADULT - NS PREOP UNDERSTANDS INFO
No solid food, dairy, candy or gum after midnight, water 08:00am. Pt. will bring photo ID/vaccination/insurance card, escort must have photo Id and must be 18+. No jewelries/valuables/contact lens, dress in comfortable clothes. No smoking/alcohol drinking/recreational drug use today. Address and call back number provided./yes

## 2022-09-29 NOTE — ASU PATIENT PROFILE, ADULT - NSICDXPASTSURGICALHX_GEN_ALL_CORE_FT
PAST SURGICAL HISTORY:  Arteriovenous fistula of left upper extremity Pos. bruit , pos. thrill ( 08/31/22@ 9:02am)    History of parathyroid surgery     S/P ECT (electroconvulsive therapy)      PAST SURGICAL HISTORY:  Arteriovenous fistula of left upper extremity Pos. bruit , pos. thrill (present 09/30/2022 at 10:35)    History of parathyroid surgery     S/P ECT (electroconvulsive therapy)

## 2022-09-30 ENCOUNTER — TRANSCRIPTION ENCOUNTER (OUTPATIENT)
Age: 72
End: 2022-09-30

## 2022-09-30 ENCOUNTER — OUTPATIENT (OUTPATIENT)
Dept: OUTPATIENT SERVICES | Facility: HOSPITAL | Age: 72
LOS: 1 days | Discharge: HOME | End: 2022-09-30
Payer: MEDICARE

## 2022-09-30 VITALS
SYSTOLIC BLOOD PRESSURE: 122 MMHG | DIASTOLIC BLOOD PRESSURE: 66 MMHG | OXYGEN SATURATION: 96 % | RESPIRATION RATE: 15 BRPM | HEART RATE: 61 BPM

## 2022-09-30 VITALS
TEMPERATURE: 98 F | DIASTOLIC BLOOD PRESSURE: 45 MMHG | SYSTOLIC BLOOD PRESSURE: 128 MMHG | HEART RATE: 61 BPM | RESPIRATION RATE: 16 BRPM | HEIGHT: 60 IN | WEIGHT: 107.81 LBS | OXYGEN SATURATION: 100 %

## 2022-09-30 DIAGNOSIS — I77.0 ARTERIOVENOUS FISTULA, ACQUIRED: Chronic | ICD-10-CM

## 2022-09-30 DIAGNOSIS — Z98.890 OTHER SPECIFIED POSTPROCEDURAL STATES: Chronic | ICD-10-CM

## 2022-09-30 LAB
ALBUMIN SERPL ELPH-MCNC: 3.5 G/DL — SIGNIFICANT CHANGE UP (ref 3.3–5)
ALP SERPL-CCNC: 115 U/L — SIGNIFICANT CHANGE UP (ref 40–120)
ALT FLD-CCNC: 26 U/L — SIGNIFICANT CHANGE UP (ref 10–45)
ANION GAP SERPL CALC-SCNC: 20 MMOL/L — HIGH (ref 5–17)
AST SERPL-CCNC: 29 U/L — SIGNIFICANT CHANGE UP (ref 10–40)
BILIRUB SERPL-MCNC: 0.5 MG/DL — SIGNIFICANT CHANGE UP (ref 0.2–1.2)
BUN SERPL-MCNC: 40 MG/DL — HIGH (ref 7–23)
CALCIUM SERPL-MCNC: 9.2 MG/DL — SIGNIFICANT CHANGE UP (ref 8.4–10.5)
CHLORIDE SERPL-SCNC: 94 MMOL/L — LOW (ref 96–108)
CO2 SERPL-SCNC: 29 MMOL/L — SIGNIFICANT CHANGE UP (ref 22–31)
CREAT SERPL-MCNC: 5.6 MG/DL — HIGH (ref 0.5–1.3)
EGFR: 8 ML/MIN/1.73M2 — LOW
GLUCOSE SERPL-MCNC: 96 MG/DL — SIGNIFICANT CHANGE UP (ref 70–99)
POTASSIUM SERPL-MCNC: 4.5 MMOL/L — SIGNIFICANT CHANGE UP (ref 3.5–5.3)
POTASSIUM SERPL-SCNC: 4.5 MMOL/L — SIGNIFICANT CHANGE UP (ref 3.5–5.3)
PROT SERPL-MCNC: 6.8 G/DL — SIGNIFICANT CHANGE UP (ref 6–8.3)
SODIUM SERPL-SCNC: 143 MMOL/L — SIGNIFICANT CHANGE UP (ref 135–145)

## 2022-09-30 PROCEDURE — 90870 ELECTROCONVULSIVE THERAPY: CPT

## 2022-09-30 NOTE — PRE-ANESTHESIA EVALUATION ADULT - NS MD HP INPLANTS MED DEV
Patient arrives to ED with c/o RUQ and LUQ abdo pain since @1400 this afternoon, patient has a ileostomy bag to her RLQ which she emptied @ 1400, and reports very little output. Left UE Fistula/None

## 2022-09-30 NOTE — ECT TREATMENT NOTE - NSECTCOMMENTS_PSY_ALL_CORE
Pt missed last 3 weeks as she was getting eval for constipation.  No obstruction, voiding feces regularly again.   More depressed, less motivated, not wanting to be social.  "fearful about not getting better".  Unable to come Mon or Wed due to other medical appts.  Will return in 1 week.  If not improved, consider return to 2x/week ECT.

## 2022-09-30 NOTE — ASU DISCHARGE PLAN (ADULT/PEDIATRIC) - ASU DC SPECIAL INSTRUCTIONSFT
AMBULATORY ELECTROCONVULSIVE THERAPY (ECT) DISCHARGE INSTRUCTIONS    Condition:  Stable for discharge to home.    1. Activities: Rest the day of your treatment.  It is normal to experience drowsiness and possibly some confusion following ECT and anesthesia.  DO NOT consume alcohol, operative machinery, drive or make important personal, business or legal decisions for at least 24 hours.    2. Diet: Begin with a light meal following ECT and progress as tolerated to a regular diet (ie. resume your normal food intake).    3. Medications: Resume medications as prescribed by your outpatient physicians.  Mild aches or headache may be experienced post treatment.  This is usually relieved  by Tylenol or other non-aspirin medications (take only amounts prescribed by each ).      4. Emergencies:  Please first call your regular outpatient psychiatric provider (ie. psychopharmacologist) for any changes in your psychiatric symptoms.   If you are unable to reach your doctor, you go directly to your local Emergency Room.      5. Routine Follow-Up Information:   Please call the ECT Department if you are unable to make your next appointment or have any questions about your ECT treatments.  You may also contact Adam Banks MD at [email or web address] or [phone] for non-urgent clinical questions about your treatment course.      REMEMBER TO NOT EAT ANYTHING FOR 8 HOURS BEFORE YOUR NEXT TREATMENT.   YOU MAY DRINK ONLY CLEAR LIQUIDS UP TO 3 HOURS BEFORE YOUR TREATMENT (eg. WATER, APPLE JUICE, GINGER ALE).    SOMEONE OVER 18 YEARS OLD MUST ACCOMPANY YOU HOME FROM THE HOSPITAL ON THE DAY OF YOUR TREATMENT.    IF YOU HAVE ANY QUESTIONS ABOUT YOUR NEXT TREATMENT, COVID TESTING, OR EXPIRATION DATES OF YOUR MEDICAL CLEARANCE, PLEASE CONTACT:     ECT Department:  Lakshmi Thomas” [place] [phone] office; [phone] fax    Your next ECT treatment will be on: Friday 10/7/22 (weekly)

## 2022-10-12 PROBLEM — E78.5 HYPERLIPIDEMIA, UNSPECIFIED: Chronic | Status: ACTIVE | Noted: 2022-10-07

## 2022-10-12 PROBLEM — F31.9 BIPOLAR DISORDER, UNSPECIFIED: Chronic | Status: ACTIVE | Noted: 2022-10-07

## 2022-10-20 NOTE — ASU PATIENT PROFILE, ADULT - BRADEN SCORE (IF 18 OR LESS ACTIVATE SKIN INJURY RISK INCREASED GUIDELINE), MLM
22 Staged Advancement Flap Text: The defect edges were debeveled with a #15 scalpel blade.  Given the location of the defect, shape of the defect and the proximity to free margins a staged advancement flap was deemed most appropriate.  Using a sterile surgical marker, an appropriate advancement flap was drawn incorporating the defect and placing the expected incisions within the relaxed skin tension lines where possible. The area thus outlined was incised deep to adipose tissue with a #15 scalpel blade.  The skin margins were undermined to an appropriate distance in all directions utilizing iris scissors.

## 2022-10-24 ENCOUNTER — APPOINTMENT (OUTPATIENT)
Dept: INTERNAL MEDICINE | Facility: CLINIC | Age: 72
End: 2022-10-24

## 2022-10-26 ENCOUNTER — APPOINTMENT (OUTPATIENT)
Dept: INTERNAL MEDICINE | Facility: CLINIC | Age: 72
End: 2022-10-26

## 2022-10-26 VITALS
BODY MASS INDEX: 21.09 KG/M2 | TEMPERATURE: 98.3 F | OXYGEN SATURATION: 97 % | HEART RATE: 65 BPM | DIASTOLIC BLOOD PRESSURE: 77 MMHG | SYSTOLIC BLOOD PRESSURE: 135 MMHG | WEIGHT: 108 LBS

## 2022-10-26 DIAGNOSIS — I10 ESSENTIAL (PRIMARY) HYPERTENSION: ICD-10-CM

## 2022-10-26 PROCEDURE — 99213 OFFICE O/P EST LOW 20 MIN: CPT | Mod: GC,25

## 2022-10-26 RX ORDER — LOSARTAN POTASSIUM 100 MG/1
100 TABLET, FILM COATED ORAL
Refills: 0 | Status: ACTIVE | COMMUNITY
Start: 2022-10-26

## 2022-10-26 RX ORDER — LAMOTRIGINE 200 MG/1
200 TABLET ORAL DAILY
Refills: 0 | Status: ACTIVE | COMMUNITY
Start: 2018-09-17

## 2022-10-26 RX ORDER — FAMOTIDINE 40 MG/1
40 TABLET, FILM COATED ORAL
Refills: 0 | Status: ACTIVE | COMMUNITY
Start: 2022-10-26

## 2022-10-26 RX ORDER — NIFEDIPINE 90 MG/1
90 TABLET, EXTENDED RELEASE ORAL
Refills: 0 | Status: ACTIVE | COMMUNITY
Start: 2022-10-26

## 2022-11-17 NOTE — ASU PATIENT PROFILE, ADULT - NSICDXPASTSURGICALHX_GEN_ALL_CORE_FT
PAST SURGICAL HISTORY:  Arteriovenous fistula of left upper extremity Pos. bruit , pos. thrill (present 09/30/2022 at 10:35)    History of parathyroid surgery     S/P ECT (electroconvulsive therapy)      PAST SURGICAL HISTORY:  Arteriovenous fistula of left upper extremity thrill felt 11/18/22    History of parathyroid surgery     S/P ECT (electroconvulsive therapy)

## 2022-11-17 NOTE — ASU PATIENT PROFILE, ADULT - NSICDXPASTMEDICALHX_GEN_ALL_CORE_FT
PAST MEDICAL HISTORY:  Acid reflux     Anxiety and depression     Bipolar disorder     ESRD on dialysis left AV fistula + tobin,  Tue-Thu-Sat, most recent dialysis    HTN (hypertension)     Hyperlipidemia      PAST MEDICAL HISTORY:  Acid reflux     Anxiety and depression     Bipolar disorder     ESRD on dialysis left AV fistula + trill,  Tue-Thu-Sat, most recent dialysis 11/17/22    HTN (hypertension)     Hyperlipidemia

## 2022-11-18 ENCOUNTER — OUTPATIENT (OUTPATIENT)
Dept: OUTPATIENT SERVICES | Facility: HOSPITAL | Age: 72
LOS: 1 days | Discharge: HOME | End: 2022-11-18

## 2022-11-18 ENCOUNTER — TRANSCRIPTION ENCOUNTER (OUTPATIENT)
Age: 72
End: 2022-11-18

## 2022-11-18 VITALS
WEIGHT: 105.38 LBS | HEART RATE: 56 BPM | SYSTOLIC BLOOD PRESSURE: 155 MMHG | RESPIRATION RATE: 16 BRPM | TEMPERATURE: 97 F | DIASTOLIC BLOOD PRESSURE: 80 MMHG | HEIGHT: 61 IN | OXYGEN SATURATION: 99 %

## 2022-11-18 VITALS
HEART RATE: 50 BPM | TEMPERATURE: 97 F | OXYGEN SATURATION: 97 % | SYSTOLIC BLOOD PRESSURE: 145 MMHG | DIASTOLIC BLOOD PRESSURE: 60 MMHG | RESPIRATION RATE: 14 BRPM

## 2022-11-18 DIAGNOSIS — Z98.890 OTHER SPECIFIED POSTPROCEDURAL STATES: Chronic | ICD-10-CM

## 2022-11-18 DIAGNOSIS — I77.0 ARTERIOVENOUS FISTULA, ACQUIRED: Chronic | ICD-10-CM

## 2022-11-18 LAB
ISTAT VENOUS BE: 2 MMOL/L — SIGNIFICANT CHANGE UP (ref -2–3)
ISTAT VENOUS GLUCOSE: 79 MG/DL — SIGNIFICANT CHANGE UP (ref 70–99)
ISTAT VENOUS HCO3: 21 MMOL/L — LOW (ref 23–28)
ISTAT VENOUS HEMATOCRIT: 34 % — LOW (ref 34.5–45)
ISTAT VENOUS HEMOGLOBIN: 11.6 GM/DL — SIGNIFICANT CHANGE UP (ref 11.5–15.5)
ISTAT VENOUS IONIZED CALCIUM: 0.81 MMOL/L — LOW (ref 1.12–1.3)
ISTAT VENOUS PCO2: <19 MMHG — LOW (ref 41–51)
ISTAT VENOUS PH: 7.68 — HIGH (ref 7.31–7.41)
ISTAT VENOUS PO2: 196 MMHG — HIGH (ref 35–40)
ISTAT VENOUS POTASSIUM: 5.7 MMOL/L — HIGH (ref 3.5–5.3)
ISTAT VENOUS SO2: 100 % — SIGNIFICANT CHANGE UP
ISTAT VENOUS SODIUM: 131 MMOL/L — LOW (ref 135–145)
ISTAT VENOUS TCO2: 21 MMOL/L — LOW (ref 22–31)

## 2022-11-18 PROCEDURE — 90870 ELECTROCONVULSIVE THERAPY: CPT

## 2022-11-18 RX ORDER — ACETAMINOPHEN 500 MG
1000 TABLET ORAL ONCE
Refills: 0 | Status: DISCONTINUED | OUTPATIENT
Start: 2022-11-18 | End: 2022-11-18

## 2022-11-18 RX ORDER — ONDANSETRON 8 MG/1
4 TABLET, FILM COATED ORAL ONCE
Refills: 0 | Status: DISCONTINUED | OUTPATIENT
Start: 2022-11-18 | End: 2022-11-18

## 2022-11-18 RX ORDER — SODIUM CHLORIDE 9 MG/ML
1000 INJECTION, SOLUTION INTRAVENOUS
Refills: 0 | Status: DISCONTINUED | OUTPATIENT
Start: 2022-11-18 | End: 2022-11-18

## 2022-11-18 NOTE — ASU DISCHARGE PLAN (ADULT/PEDIATRIC) - NS MD DC FALL RISK RISK
For information on Fall & Injury Prevention, visit: [email or web address] OR  [email or web address] OR  https://www.cdc.gov/steadi/patient.html

## 2022-11-18 NOTE — ASU DISCHARGE PLAN (ADULT/PEDIATRIC) - ASU DC SPECIAL INSTRUCTIONSFT
AMBULATORY ELECTROCONVULSIVE THERAPY (ECT) DISCHARGE INSTRUCTIONS    Condition:  Stable for discharge to home.    1. Activities: Rest the day of your treatment.  It is normal to experience drowsiness and possibly some confusion following ECT and anesthesia.  DO NOT consume alcohol, operative machinery, drive or make important personal, business or legal decisions for at least 24 hours.    2. Diet: Begin with a light meal following ECT and progress as tolerated to a regular diet (ie. resume your normal food intake).    3. Medications: Resume medications as prescribed by your outpatient physicians.  Mild aches or headache may be experienced post treatment.  This is usually relieved  by Tylenol or other non-aspirin medications (take only amounts prescribed by each ).      4. Emergencies:  Please first call your regular outpatient psychiatric provider (ie. psychopharmacologist) for any changes in your psychiatric symptoms.   If you are unable to reach your doctor, you go directly to your local Emergency Room.      5. Routine Follow-Up Information:   Please call the ECT Department if you are unable to make your next appointment or have any questions about your ECT treatments.  You may also contact Adam Banks MD at [email or web address] or [phone] for non-urgent clinical questions about your treatment course.      REMEMBER TO NOT EAT ANYTHING FOR 8 HOURS BEFORE YOUR NEXT TREATMENT.   YOU MAY DRINK ONLY CLEAR LIQUIDS UP TO 3 HOURS BEFORE YOUR TREATMENT (eg. WATER, APPLE JUICE, GINGER ALE).    SOMEONE OVER 18 YEARS OLD MUST ACCOMPANY YOU HOME FROM THE HOSPITAL ON THE DAY OF YOUR TREATMENT.    IF YOU HAVE ANY QUESTIONS ABOUT YOUR NEXT TREATMENT, COVID TESTING, OR EXPIRATION DATES OF YOUR MEDICAL CLEARANCE, PLEASE CONTACT:     ECT Department:  Lakshmi Thomas” [place] [phone] office; [phone] fax      Your next ECT treatment will be on:  Friday 12/2/22 (2 weeks)

## 2022-11-18 NOTE — ECT TREATMENT NOTE - NSECTCOMMENTS_PSY_ALL_CORE
Pt states she had considered stopping ECT but realized she felt very depressed about 2 weeks ago so after talk with outpt psych MD that she should continue ECT to keep higher level of functioning eventhough she didn't totally get 100% symptom relief.  Pt agrees with plan.   States depression did somewhat improve over last 2 weeks and she feels close to baseline she was at at last appt.   Unable to come next week due to HD schedule and holiday.  Will return in 2 weeks and see how she feels in the interim.  Aware she can call for earlier appt if any symptom of relapse.  BDI = 15    K=5.7 today.  EKG stable.  Will alter paralytic dose today to lower risk of inc serum potassium.  Pt will go to HD tomorrow.

## 2022-11-28 ENCOUNTER — APPOINTMENT (OUTPATIENT)
Dept: INTERNAL MEDICINE | Facility: CLINIC | Age: 72
End: 2022-11-28

## 2022-11-28 VITALS
TEMPERATURE: 97.2 F | OXYGEN SATURATION: 96 % | HEIGHT: 60 IN | HEART RATE: 58 BPM | BODY MASS INDEX: 20.42 KG/M2 | SYSTOLIC BLOOD PRESSURE: 149 MMHG | WEIGHT: 104 LBS | DIASTOLIC BLOOD PRESSURE: 86 MMHG

## 2022-11-28 PROCEDURE — 36415 COLL VENOUS BLD VENIPUNCTURE: CPT

## 2022-11-28 PROCEDURE — 99214 OFFICE O/P EST MOD 30 MIN: CPT | Mod: GC,25

## 2022-12-01 NOTE — ASU PATIENT PROFILE, ADULT - NSICDXPASTMEDICALHX_GEN_ALL_CORE_FT
PAST MEDICAL HISTORY:  Acid reflux     Anxiety and depression     Bipolar disorder     ESRD on dialysis left AV fistula + trill,  Tue-Thu-Sat, most recent dialysis 11/17/22    HTN (hypertension)     Hyperlipidemia      PAST MEDICAL HISTORY:  Acid reflux     Anxiety and depression     Bipolar disorder     ESRD on dialysis left AV fistula + trill,  Tue-Thu-Sat, most recent dialysis 12/1/22    HTN (hypertension)     Hyperlipidemia

## 2022-12-01 NOTE — ASU PATIENT PROFILE, ADULT - NSICDXPASTSURGICALHX_GEN_ALL_CORE_FT
PAST SURGICAL HISTORY:  Arteriovenous fistula of left upper extremity thrill felt 11/18/22    History of parathyroid surgery     S/P ECT (electroconvulsive therapy)      PAST SURGICAL HISTORY:  Arteriovenous fistula of left upper extremity thrill felt 12/2/22    History of parathyroid surgery     S/P ECT (electroconvulsive therapy)

## 2022-12-01 NOTE — ASU PATIENT PROFILE, ADULT - NS PREOP UNDERSTANDS INFO
No solid food/dairy/candy/gum after midnight, water before 07:00am. Pt to bring photo ID/Insurance card. Escort must have photo ID. Address and call back number given./yes

## 2022-12-01 NOTE — ASU PATIENT PROFILE, ADULT - FALL HARM RISK - UNIVERSAL INTERVENTIONS
Bed in lowest position, wheels locked, appropriate side rails in place/Call bell, personal items and telephone in reach/Instruct patient to call for assistance before getting out of bed or chair/Non-slip footwear when patient is out of bed/[place] to call system/Physically safe environment - no spills, clutter or unnecessary equipment/Purposeful Proactive Rounding/Room/bathroom lighting operational, light cord in reach

## 2022-12-01 NOTE — ASU PATIENT PROFILE, ADULT - ANESTHESIA, PREVIOUS REACTION, PROFILE
CMP wnl  Lipid Panel: ASCVD risk:10-year risk of heart disease or stroke 3.8%. No indication to be on statin. Advise lifestyle changes. Labs sent via mail. none

## 2022-12-02 ENCOUNTER — TRANSCRIPTION ENCOUNTER (OUTPATIENT)
Age: 72
End: 2022-12-02

## 2022-12-02 ENCOUNTER — OUTPATIENT (OUTPATIENT)
Dept: OUTPATIENT SERVICES | Facility: HOSPITAL | Age: 72
LOS: 1 days | Discharge: HOME | End: 2022-12-02

## 2022-12-02 VITALS
RESPIRATION RATE: 17 BRPM | OXYGEN SATURATION: 97 % | SYSTOLIC BLOOD PRESSURE: 133 MMHG | HEART RATE: 62 BPM | DIASTOLIC BLOOD PRESSURE: 61 MMHG

## 2022-12-02 VITALS
HEIGHT: 61 IN | RESPIRATION RATE: 16 BRPM | OXYGEN SATURATION: 96 % | WEIGHT: 103.62 LBS | TEMPERATURE: 98 F | DIASTOLIC BLOOD PRESSURE: 77 MMHG | SYSTOLIC BLOOD PRESSURE: 147 MMHG | HEART RATE: 64 BPM

## 2022-12-02 DIAGNOSIS — I77.0 ARTERIOVENOUS FISTULA, ACQUIRED: Chronic | ICD-10-CM

## 2022-12-02 DIAGNOSIS — Z98.890 OTHER SPECIFIED POSTPROCEDURAL STATES: Chronic | ICD-10-CM

## 2022-12-02 LAB
ALBUMIN SERPL ELPH-MCNC: 3.8 G/DL — SIGNIFICANT CHANGE UP (ref 3.3–5)
ALP SERPL-CCNC: 157 U/L — HIGH (ref 40–120)
ALT FLD-CCNC: 23 U/L — SIGNIFICANT CHANGE UP (ref 10–45)
ANION GAP SERPL CALC-SCNC: 10 MMOL/L — SIGNIFICANT CHANGE UP (ref 5–17)
AST SERPL-CCNC: 24 U/L — SIGNIFICANT CHANGE UP (ref 10–40)
BILIRUB SERPL-MCNC: 0.6 MG/DL — SIGNIFICANT CHANGE UP (ref 0.2–1.2)
BUN SERPL-MCNC: 38 MG/DL — HIGH (ref 7–23)
CALCIUM SERPL-MCNC: 9.4 MG/DL — SIGNIFICANT CHANGE UP (ref 8.4–10.5)
CHLORIDE SERPL-SCNC: 96 MMOL/L — SIGNIFICANT CHANGE UP (ref 96–108)
CO2 SERPL-SCNC: 32 MMOL/L — HIGH (ref 22–31)
CREAT SERPL-MCNC: 5.1 MG/DL — HIGH (ref 0.5–1.3)
EGFR: 8 ML/MIN/1.73M2 — LOW
GLUCOSE SERPL-MCNC: 93 MG/DL — SIGNIFICANT CHANGE UP (ref 70–99)
POTASSIUM SERPL-MCNC: 5 MMOL/L — SIGNIFICANT CHANGE UP (ref 3.5–5.3)
POTASSIUM SERPL-SCNC: 5 MMOL/L — SIGNIFICANT CHANGE UP (ref 3.5–5.3)
PROT SERPL-MCNC: 7 G/DL — SIGNIFICANT CHANGE UP (ref 6–8.3)
SODIUM SERPL-SCNC: 138 MMOL/L — SIGNIFICANT CHANGE UP (ref 135–145)

## 2022-12-02 PROCEDURE — 90870 ELECTROCONVULSIVE THERAPY: CPT

## 2022-12-02 RX ORDER — METHOHEXITAL SODIUM 2.5 G
50 VIAL (EA) INJECTION ONCE
Refills: 0 | Status: DISCONTINUED | OUTPATIENT
Start: 2022-12-02 | End: 2022-12-02

## 2022-12-02 NOTE — PRE-ANESTHESIA EVALUATION ADULT - TEMPERATURE IN CELSIUS (DEGREES C)
[facility] Transitions Initial Follow Up Call    Call within 2 business days of discharge: Yes    Patient: Subha Caballero Patient : [id number]   MRN: <[id number]>  Reason for Admission: -2021 Makenna Markham   Manuel Connell   2021  2:15 PM Delroy Rosario MD [address]   2021  1:30 PM Damaris Rodriguez MD 0775 Toby Garcias RN
36.7

## 2022-12-02 NOTE — ASU DISCHARGE PLAN (ADULT/PEDIATRIC) - NS MD DC FALL RISK RISK
For information on Fall & Injury Prevention, visit: [email or web address] OR  [email or web address] OR  https://www.cdc.gov/steadi/patient.html

## 2022-12-02 NOTE — ASU DISCHARGE PLAN (ADULT/PEDIATRIC) - ASU DC SPECIAL INSTRUCTIONSFT
AMBULATORY ELECTROCONVULSIVE THERAPY (ECT) DISCHARGE INSTRUCTIONS    Condition:  Stable for discharge to home.    1. Activities: Rest the day of your treatment.  It is normal to experience drowsiness and possibly some confusion following ECT and anesthesia.  DO NOT consume alcohol, operative machinery, drive or make important personal, business or legal decisions for at least 24 hours.    2. Diet: Begin with a light meal following ECT and progress as tolerated to a regular diet (ie. resume your normal food intake).    3. Medications: Resume medications as prescribed by your outpatient physicians.  Mild aches or headache may be experienced post treatment.  This is usually relieved  by Tylenol or other non-aspirin medications (take only amounts prescribed by each ).      4. Emergencies:  Please first call your regular outpatient psychiatric provider (ie. psychopharmacologist) for any changes in your psychiatric symptoms.   If you are unable to reach your doctor, you go directly to your local Emergency Room.      5. Routine Follow-Up Information:   Please call the ECT Department if you are unable to make your next appointment or have any questions about your ECT treatments.  You may also contact Adam Banks MD at [email or web address] or [phone] for non-urgent clinical questions about your treatment course.      REMEMBER TO NOT EAT ANYTHING FOR 8 HOURS BEFORE YOUR NEXT TREATMENT.   YOU MAY DRINK ONLY CLEAR LIQUIDS UP TO 3 HOURS BEFORE YOUR TREATMENT (eg. WATER, APPLE JUICE, GINGER ALE).    SOMEONE OVER 18 YEARS OLD MUST ACCOMPANY YOU HOME FROM THE HOSPITAL ON THE DAY OF YOUR TREATMENT.    IF YOU HAVE ANY QUESTIONS ABOUT YOUR NEXT TREATMENT, COVID TESTING, OR EXPIRATION DATES OF YOUR MEDICAL CLEARANCE, PLEASE CONTACT:     ECT Department:  Lakshmi “Jaren” Angelina [phone] office; [phone] fax      Your next ECT treatment will be on: 12/16/22 AMBULATORY ELECTROCONVULSIVE THERAPY (ECT) DISCHARGE INSTRUCTIONS    Condition:  Stable for discharge to home.    1. Activities: Rest the day of your treatment.  It is normal to experience drowsiness and possibly some confusion following ECT and anesthesia.  DO NOT consume alcohol, operative machinery, drive or make important personal, business or legal decisions for at least 24 hours.    2. Diet: Begin with a light meal following ECT and progress as tolerated to a regular diet (ie. resume your normal food intake).    3. Medications: Resume medications as prescribed by your outpatient physicians.  Mild aches or headache may be experienced post treatment.  This is usually relieved  by Tylenol or other non-aspirin medications (take only amounts prescribed by each ).      4. Emergencies:  Please first call your regular outpatient psychiatric provider (ie. psychopharmacologist) for any changes in your psychiatric symptoms.   If you are unable to reach your doctor, you go directly to your local Emergency Room.      5. Routine Follow-Up Information:   Please call the ECT Department if you are unable to make your next appointment or have any questions about your ECT treatments.  You may also contact Adam Banks MD at [email or web address] or [phone] for non-urgent clinical questions about your treatment course.      REMEMBER TO NOT EAT ANYTHING FOR 8 HOURS BEFORE YOUR NEXT TREATMENT.   YOU MAY DRINK ONLY CLEAR LIQUIDS UP TO 3 HOURS BEFORE YOUR TREATMENT (eg. WATER, APPLE JUICE, GINGER ALE).    SOMEONE OVER 18 YEARS OLD MUST ACCOMPANY YOU HOME FROM THE HOSPITAL ON THE DAY OF YOUR TREATMENT.    IF YOU HAVE ANY QUESTIONS ABOUT YOUR NEXT TREATMENT, COVID TESTING, OR EXPIRATION DATES OF YOUR MEDICAL CLEARANCE, PLEASE CONTACT:     ECT Department:  Lakshmi Thomas” Angelina [phone] office; [phone] fax      Your next ECT treatment will be on: 12/9/22 (weekly)

## 2022-12-02 NOTE — ECT TREATMENT NOTE - NSECTCOMMENTS_PSY_ALL_CORE
Mood "ok".  Still with morning depression (not wanting to get OOB, low motivation, feeling of dread) but states after she showers she feels better and "pretty good" rest of day.  No SI.  AOx3.  Wishes to not come more than q2 weeks.  Understands q2 weeks may mean she will remain at this level and symptoms may not further improve.  She agrees with this.    States she plans to get dental implants soon.  Will discuss any anesthesia concerns with team prior to that starting and give official recs.      BDI = 16 Mood "ok".  Still with morning depression (not wanting to get OOB, low motivation, feeling of dread) but states after she showers she feels better and "pretty good" rest of day.  No SI.  AOx3.  Agrees to return in 1 week to see if symptoms improve.   BDI = 16    States she plans to get dental implants soon.  Will discuss any anesthesia concerns with team prior to that starting and give official recs.     Mood "ok".  Still with morning depression (not wanting to get OOB, low motivation, feeling of dread) but states after she showers she feels better and "pretty good" rest of day.  No SI.  AOx3.  No cognitive complaints.   Agrees to return in 1 week to see if symptoms improve.   BDI = 16,  K=5.0 today    States she plans to get dental implants soon.  Will discuss any anesthesia concerns with team prior to that starting and give official recs.

## 2022-12-08 NOTE — ASU PATIENT PROFILE, ADULT - NSICDXPASTSURGICALHX_GEN_ALL_CORE_FT
PAST SURGICAL HISTORY:  Arteriovenous fistula of left upper extremity thrill felt 12/2/22    History of parathyroid surgery     S/P ECT (electroconvulsive therapy)

## 2022-12-08 NOTE — ASU PATIENT PROFILE, ADULT - NSICDXPASTMEDICALHX_GEN_ALL_CORE_FT
PAST MEDICAL HISTORY:  Acid reflux     Anxiety and depression     Bipolar disorder     ESRD on dialysis left AV fistula + trill,  Tue-Thu-Sat, most recent dialysis 12/1/22    HTN (hypertension)     Hyperlipidemia

## 2022-12-08 NOTE — ASU PATIENT PROFILE, ADULT - ABLE TO REACH PT
voicemail instructions; Arrival time 8am 12/9/2022/ no solid or milk products after 12 mid-night 12/8/2022/ medication as per doctor/ photo ID and insurance card/ escort to take you home/no

## 2022-12-08 NOTE — ASU PATIENT PROFILE, ADULT - FALL HARM RISK - UNIVERSAL INTERVENTIONS
Bed in lowest position, wheels locked, appropriate side rails in place/Call bell, personal items and telephone in reach/Instruct patient to call for assistance before getting out of bed or chair/Non-slip footwear when patient is out of bed/[place] to call system/Physically safe environment - no spills, clutter or unnecessary equipment/Purposeful Proactive Rounding/Room/bathroom lighting operational, light cord in reach

## 2022-12-09 ENCOUNTER — OUTPATIENT (OUTPATIENT)
Dept: OUTPATIENT SERVICES | Facility: HOSPITAL | Age: 72
LOS: 1 days | Discharge: HOME | End: 2022-12-09

## 2022-12-09 ENCOUNTER — TRANSCRIPTION ENCOUNTER (OUTPATIENT)
Age: 72
End: 2022-12-09

## 2022-12-09 VITALS
RESPIRATION RATE: 14 BRPM | SYSTOLIC BLOOD PRESSURE: 121 MMHG | DIASTOLIC BLOOD PRESSURE: 67 MMHG | HEART RATE: 53 BPM | OXYGEN SATURATION: 95 %

## 2022-12-09 VITALS
SYSTOLIC BLOOD PRESSURE: 138 MMHG | HEART RATE: 58 BPM | WEIGHT: 104.94 LBS | DIASTOLIC BLOOD PRESSURE: 83 MMHG | RESPIRATION RATE: 16 BRPM | OXYGEN SATURATION: 97 % | TEMPERATURE: 98 F | HEIGHT: 61 IN

## 2022-12-09 DIAGNOSIS — I77.0 ARTERIOVENOUS FISTULA, ACQUIRED: Chronic | ICD-10-CM

## 2022-12-09 DIAGNOSIS — Z98.890 OTHER SPECIFIED POSTPROCEDURAL STATES: Chronic | ICD-10-CM

## 2022-12-09 LAB
ISTAT VENOUS BE: 3 MMOL/L — SIGNIFICANT CHANGE UP (ref -2–3)
ISTAT VENOUS GLUCOSE: 84 MG/DL — SIGNIFICANT CHANGE UP (ref 70–99)
ISTAT VENOUS HCO3: 29 MMOL/L — HIGH (ref 23–28)
ISTAT VENOUS HEMATOCRIT: 36 % — SIGNIFICANT CHANGE UP (ref 34.5–45)
ISTAT VENOUS HEMOGLOBIN: 12.2 GM/DL — SIGNIFICANT CHANGE UP (ref 11.5–15.5)
ISTAT VENOUS IONIZED CALCIUM: 1.16 MMOL/L — SIGNIFICANT CHANGE UP (ref 1.12–1.3)
ISTAT VENOUS PCO2: 49 MMHG — SIGNIFICANT CHANGE UP (ref 41–51)
ISTAT VENOUS PH: 7.38 — SIGNIFICANT CHANGE UP (ref 7.31–7.41)
ISTAT VENOUS PO2: <66 MMHG — SIGNIFICANT CHANGE UP (ref 35–40)
ISTAT VENOUS POTASSIUM: 4.7 MMOL/L — SIGNIFICANT CHANGE UP (ref 3.5–5.3)
ISTAT VENOUS SO2: 65 % — SIGNIFICANT CHANGE UP
ISTAT VENOUS SODIUM: 133 MMOL/L — LOW (ref 135–145)
ISTAT VENOUS TCO2: 31 MMOL/L — SIGNIFICANT CHANGE UP (ref 22–31)

## 2022-12-09 PROCEDURE — 90870 ELECTROCONVULSIVE THERAPY: CPT

## 2022-12-09 RX ORDER — ONDANSETRON 8 MG/1
4 TABLET, FILM COATED ORAL ONCE
Refills: 0 | Status: DISCONTINUED | OUTPATIENT
Start: 2022-12-09 | End: 2022-12-09

## 2022-12-09 RX ORDER — ACETAMINOPHEN 500 MG
1000 TABLET ORAL ONCE
Refills: 0 | Status: DISCONTINUED | OUTPATIENT
Start: 2022-12-09 | End: 2022-12-09

## 2022-12-09 RX ORDER — SODIUM CHLORIDE 9 MG/ML
1000 INJECTION, SOLUTION INTRAVENOUS
Refills: 0 | Status: DISCONTINUED | OUTPATIENT
Start: 2022-12-09 | End: 2022-12-09

## 2022-12-09 RX ORDER — FENTANYL CITRATE 50 UG/ML
25 INJECTION INTRAVENOUS
Refills: 0 | Status: DISCONTINUED | OUTPATIENT
Start: 2022-12-09 | End: 2022-12-09

## 2022-12-09 NOTE — ASU DISCHARGE PLAN (ADULT/PEDIATRIC) - ASU DC SPECIAL INSTRUCTIONSFT
AMBULATORY ELECTROCONVULSIVE THERAPY (ECT) DISCHARGE INSTRUCTIONS    Condition:  Stable for discharge to home.    1. Activities: Rest the day of your treatment.  It is normal to experience drowsiness and possibly some confusion following ECT and anesthesia.  DO NOT consume alcohol, operative machinery, drive or make important personal, business or legal decisions for at least 24 hours.    2. Diet: Begin with a light meal following ECT and progress as tolerated to a regular diet (ie. resume your normal food intake).    3. Medications: Resume medications as prescribed by your outpatient physicians.  Mild aches or headache may be experienced post treatment.  This is usually relieved  by Tylenol or other non-aspirin medications (take only amounts prescribed by each ).      4. Emergencies:  Please first call your regular outpatient psychiatric provider (ie. psychopharmacologist) for any changes in your psychiatric symptoms.   If you are unable to reach your doctor, you go directly to your local Emergency Room.      5. Routine Follow-Up Information:   Please call the ECT Department if you are unable to make your next appointment or have any questions about your ECT treatments.  You may also contact Adam Banks MD at [email or web address] or [phone] for non-urgent clinical questions about your treatment course.      REMEMBER TO NOT EAT ANYTHING FOR 8 HOURS BEFORE YOUR NEXT TREATMENT.   YOU MAY DRINK ONLY CLEAR LIQUIDS UP TO 3 HOURS BEFORE YOUR TREATMENT (eg. WATER, APPLE JUICE, GINGER ALE).    SOMEONE OVER 18 YEARS OLD MUST ACCOMPANY YOU HOME FROM THE HOSPITAL ON THE DAY OF YOUR TREATMENT.    IF YOU HAVE ANY QUESTIONS ABOUT YOUR NEXT TREATMENT, COVID TESTING, OR EXPIRATION DATES OF YOUR MEDICAL CLEARANCE, PLEASE CONTACT:     ECT Department:  Lakshmi Thomas” Angelina [phone] office; [phone] fax      Your next ECT treatment will be on:  Friday 12/16/22 (1 week)

## 2022-12-09 NOTE — ASU DISCHARGE PLAN (ADULT/PEDIATRIC) - NS MD DC FALL RISK RISK
For information on Fall & Injury Prevention, visit: [email or web address] OR  [email or web address] OR  https://www.cdc.gov/steadi/patient.html

## 2022-12-09 NOTE — ECT TREATMENT NOTE - NSECTPOSTTXSUMMARY_PSY_ALL_CORE
"flipper" bottom The patient had a well modified grand mal seizure under general anesthesia and a muscle relaxant.  The patient is alert, responsive, in no acute distress.  Recovery uneventful.

## 2022-12-09 NOTE — ECT TREATMENT NOTE - NSECTCOMMENTS_PSY_ALL_CORE
Mood "better".  States she is aware she has more energy and motivation to be social.  No SI.  AOx3.  No cognitive complaints.   Agrees to return in 1 week to see if symptoms improve.  When stable, consider further taper of frequency.   BDI = 12   Mood "better".  States she is aware she has more energy and motivation to be social.  No SI.  AOx3.  No cognitive complaints.   Agrees to return in 1 week to see if symptoms improve.  When stable, consider further taper of frequency.   BDI = 12. K=4.7 today

## 2022-12-15 NOTE — ASU PATIENT PROFILE, ADULT - NSICDXPASTSURGICALHX_GEN_ALL_CORE_FT
PAST SURGICAL HISTORY:  Arteriovenous fistula of left upper extremity     History of parathyroid surgery     S/P ECT (electroconvulsive therapy)

## 2022-12-15 NOTE — ASU PATIENT PROFILE, ADULT - NSICDXPASTMEDICALHX_GEN_ALL_CORE_FT
PAST MEDICAL HISTORY:  Acid reflux     Anxiety and depression     Bipolar disorder     ESRD on dialysis left AV fistula + trill,  Tue-Thu-Sat, most recent dialysis 12/15/22    HTN (hypertension)     Hyperlipidemia

## 2022-12-15 NOTE — ASU PATIENT PROFILE, ADULT - ABLE TO REACH PT
Voice message left on patient's phone: arrival time is  8:30 am,  procedure time is at  10:30 am ,  must remain NPO after 2300 pm tonight , no solid foods, no chewing gums,  allow to drink water till  5 am , take  medication for blood pressure in am with sip of water, dress comfortable,  bring ID photo, insurance and vaccination cards,  Escort arranged must be a person  of 18 years of  age  address and telephone given to patient to call any time/no
2 = assistive person

## 2022-12-16 ENCOUNTER — OUTPATIENT (OUTPATIENT)
Dept: OUTPATIENT SERVICES | Facility: HOSPITAL | Age: 72
LOS: 1 days | Discharge: HOME | End: 2022-12-16

## 2022-12-16 ENCOUNTER — TRANSCRIPTION ENCOUNTER (OUTPATIENT)
Age: 72
End: 2022-12-16

## 2022-12-16 VITALS
DIASTOLIC BLOOD PRESSURE: 84 MMHG | RESPIRATION RATE: 14 BRPM | OXYGEN SATURATION: 96 % | HEART RATE: 64 BPM | SYSTOLIC BLOOD PRESSURE: 139 MMHG

## 2022-12-16 VITALS
DIASTOLIC BLOOD PRESSURE: 76 MMHG | HEIGHT: 61 IN | SYSTOLIC BLOOD PRESSURE: 124 MMHG | RESPIRATION RATE: 16 BRPM | TEMPERATURE: 98 F | HEART RATE: 66 BPM | WEIGHT: 102.96 LBS | OXYGEN SATURATION: 100 %

## 2022-12-16 DIAGNOSIS — Z98.890 OTHER SPECIFIED POSTPROCEDURAL STATES: Chronic | ICD-10-CM

## 2022-12-16 DIAGNOSIS — I77.0 ARTERIOVENOUS FISTULA, ACQUIRED: Chronic | ICD-10-CM

## 2022-12-16 LAB
ALBUMIN SERPL ELPH-MCNC: 3.5 G/DL — SIGNIFICANT CHANGE UP (ref 3.3–5)
ALP SERPL-CCNC: 161 U/L — HIGH (ref 40–120)
ALT FLD-CCNC: 26 U/L — SIGNIFICANT CHANGE UP (ref 10–45)
ANION GAP SERPL CALC-SCNC: 11 MMOL/L — SIGNIFICANT CHANGE UP (ref 5–17)
AST SERPL-CCNC: 30 U/L — SIGNIFICANT CHANGE UP (ref 10–40)
BILIRUB SERPL-MCNC: 0.7 MG/DL — SIGNIFICANT CHANGE UP (ref 0.2–1.2)
BUN SERPL-MCNC: 49 MG/DL — HIGH (ref 7–23)
CALCIUM SERPL-MCNC: 9.3 MG/DL — SIGNIFICANT CHANGE UP (ref 8.4–10.5)
CHLORIDE SERPL-SCNC: 101 MMOL/L — SIGNIFICANT CHANGE UP (ref 96–108)
CO2 SERPL-SCNC: 31 MMOL/L — SIGNIFICANT CHANGE UP (ref 22–31)
CREAT SERPL-MCNC: 5 MG/DL — HIGH (ref 0.5–1.3)
EGFR: 9 ML/MIN/1.73M2 — LOW
GLUCOSE SERPL-MCNC: 95 MG/DL — SIGNIFICANT CHANGE UP (ref 70–99)
POTASSIUM SERPL-MCNC: 5.6 MMOL/L — HIGH (ref 3.5–5.3)
POTASSIUM SERPL-SCNC: 5.6 MMOL/L — HIGH (ref 3.5–5.3)
PROT SERPL-MCNC: 6.6 G/DL — SIGNIFICANT CHANGE UP (ref 6–8.3)
SODIUM SERPL-SCNC: 143 MMOL/L — SIGNIFICANT CHANGE UP (ref 135–145)

## 2022-12-16 PROCEDURE — 90870 ELECTROCONVULSIVE THERAPY: CPT

## 2022-12-16 RX ORDER — ACETAMINOPHEN 500 MG
650 TABLET ORAL ONCE
Refills: 0 | Status: DISCONTINUED | OUTPATIENT
Start: 2022-12-16 | End: 2022-12-16

## 2022-12-16 RX ORDER — SODIUM CHLORIDE 9 MG/ML
40 INJECTION INTRAMUSCULAR; INTRAVENOUS; SUBCUTANEOUS
Refills: 0 | Status: DISCONTINUED | OUTPATIENT
Start: 2022-12-16 | End: 2022-12-16

## 2022-12-16 RX ORDER — ONDANSETRON 8 MG/1
4 TABLET, FILM COATED ORAL ONCE
Refills: 0 | Status: DISCONTINUED | OUTPATIENT
Start: 2022-12-16 | End: 2022-12-16

## 2022-12-16 NOTE — PROVIDER CONTACT NOTE (OTHER) - SITUATION
Pt on dialysis. recent 12/15/22. pt's K - 5.6 collected this am. MD Duque - anesthesia made aware.  no further orders.

## 2022-12-16 NOTE — ECT TREATMENT NOTE - NSECTCOMMENTS_PSY_ALL_CORE
Mood "I'm ok doc".  Smiles when she sees this MD.  States seeing me makes her happy but later states coming for ECT makes her anxious.  Mood lower first thing in morning but when OOB she feels fine.  States on non-HD or ECT days she feels better than tx days.  Agrees she feels about the same as last week (didn't complete BDI) and wishes to taper to q2 week frequency.

## 2022-12-16 NOTE — ASU DISCHARGE PLAN (ADULT/PEDIATRIC) - ASU DC SPECIAL INSTRUCTIONSFT
AMBULATORY ELECTROCONVULSIVE THERAPY (ECT) DISCHARGE INSTRUCTIONS    Condition:  Stable for discharge to home.    1. Activities: Rest the day of your treatment.  It is normal to experience drowsiness and possibly some confusion following ECT and anesthesia.  DO NOT consume alcohol, operative machinery, drive or make important personal, business or legal decisions for at least 24 hours.    2. Diet: Begin with a light meal following ECT and progress as tolerated to a regular diet (ie. resume your normal food intake).    3. Medications: Resume medications as prescribed by your outpatient physicians.  Mild aches or headache may be experienced post treatment.  This is usually relieved  by Tylenol or other non-aspirin medications (take only amounts prescribed by each ).      4. Emergencies:  Please first call your regular outpatient psychiatric provider (ie. psychopharmacologist) for any changes in your psychiatric symptoms.   If you are unable to reach your doctor, you go directly to your local Emergency Room.      5. Routine Follow-Up Information:   Please call the ECT Department if you are unable to make your next appointment or have any questions about your ECT treatments.  You may also contact Adam Banks MD at [email or web address] or [phone] for non-urgent clinical questions about your treatment course.      REMEMBER TO NOT EAT ANYTHING FOR 8 HOURS BEFORE YOUR NEXT TREATMENT.   YOU MAY DRINK ONLY CLEAR LIQUIDS UP TO 3 HOURS BEFORE YOUR TREATMENT (eg. WATER, APPLE JUICE, GINGER ALE).    SOMEONE OVER 18 YEARS OLD MUST ACCOMPANY YOU HOME FROM THE HOSPITAL ON THE DAY OF YOUR TREATMENT.    IF YOU HAVE ANY QUESTIONS ABOUT YOUR NEXT TREATMENT, COVID TESTING, OR EXPIRATION DATES OF YOUR MEDICAL CLEARANCE, PLEASE CONTACT:     ECT Department:  Lakshmi Thomas” Angelina [phone] office; [phone] fax      Your next ECT treatment will be on: Friday 12/30/22 (2 weeks)

## 2022-12-16 NOTE — PRE-ANESTHESIA EVALUATION ADULT - BMI (KG/M2)
Pt to triage with c/o lightheaded, nausea, vomiting, and HA. Pt reports not being able to eat or drink today. Pt reports taking excedrin for HA without much relief.   
19.5

## 2022-12-16 NOTE — ECT TREATMENT NOTE - NSSUICPROTFACT_PSY_ALL_CORE
Simethicone 250 MG CAPSV      Last Written Prescription Date:  7/17/17  Last Fill Quantity: 60,   # refills: 1  Future Office visit:    Next 5 appointments (look out 90 days)     Dec 29, 2017 10:30 AM CST   Return Visit with CALEB Alcaraz CNP   [facility] ([facility])    [address]  [address]  [facility] [address]   [phone]                   Routing refill request to provider for review/approval because:  Drug not on the [facility], P or [facility] refill protocol or controlled substance  Drug not active on patient's medication list  ,Please authorize if appropriate.  Thanks,  Devora Zimmerman RN       Responsibility to children, family, or others/Identifies reasons for living/Supportive social network of family or friends

## 2022-12-21 NOTE — ASU DISCHARGE PLAN (ADULT/PEDIATRIC) - CARE COORDINATION DISCHARGE PLANNING
Body Location Override (Optional - Billing Will Still Be Based On Selected Body Map Location If Applicable): left nasal sidewall
Detail Level: Detailed
Add 75172 Cpt? (Important Note: In 2017 The Use Of 13554 Is Being Tracked By Cms To Determine Future Global Period Reimbursement For Global Periods): no
Wound Evaluated By: Maximilian Salazar M.D.
No

## 2023-01-04 ENCOUNTER — APPOINTMENT (OUTPATIENT)
Dept: INTERNAL MEDICINE | Facility: CLINIC | Age: 73
End: 2023-01-04

## 2023-01-04 PROBLEM — N18.6 END STAGE RENAL DISEASE: Chronic | Status: ACTIVE | Noted: 2022-09-07

## 2023-01-11 ENCOUNTER — APPOINTMENT (OUTPATIENT)
Dept: INTERNAL MEDICINE | Facility: CLINIC | Age: 73
End: 2023-01-11

## 2023-01-19 NOTE — ASU PREOP CHECKLIST - WARM FLUIDS/WARM BLANKETS
January 19, 2023      Dustin Jeter  [address]   [facility] [address]        To Whom It May Concern:    Dustin Jeter was seen in our clinic 1/19/2022. He may return to school 1/20/2022 without restrictions.  Please excuse 1/18/2022 and 1/19/2022.        Sincerely,        Funmilayo Mcnulty MD          
no

## 2023-01-23 ENCOUNTER — APPOINTMENT (OUTPATIENT)
Dept: INTERNAL MEDICINE | Facility: CLINIC | Age: 73
End: 2023-01-23
Payer: MEDICARE

## 2023-01-23 VITALS
BODY MASS INDEX: 20.69 KG/M2 | HEART RATE: 61 BPM | TEMPERATURE: 96 F | DIASTOLIC BLOOD PRESSURE: 84 MMHG | HEIGHT: 60 IN | OXYGEN SATURATION: 97 % | SYSTOLIC BLOOD PRESSURE: 155 MMHG | WEIGHT: 105.4 LBS

## 2023-01-23 DIAGNOSIS — F31.4 BIPOLAR DISORDER, CURRENT EPISODE DEPRESSED, SEVERE, W/OUT PSYCHOTIC FEATURES: ICD-10-CM

## 2023-01-23 DIAGNOSIS — N18.6 END STAGE RENAL DISEASE: ICD-10-CM

## 2023-01-23 DIAGNOSIS — Z01.818 ENCOUNTER FOR OTHER PREPROCEDURAL EXAMINATION: ICD-10-CM

## 2023-01-23 DIAGNOSIS — Z99.2 END STAGE RENAL DISEASE: ICD-10-CM

## 2023-01-23 PROCEDURE — 99214 OFFICE O/P EST MOD 30 MIN: CPT | Mod: GC

## 2023-01-27 ENCOUNTER — OUTPATIENT (OUTPATIENT)
Dept: OUTPATIENT SERVICES | Facility: HOSPITAL | Age: 73
LOS: 1 days | Discharge: HOME | End: 2023-01-27

## 2023-01-27 ENCOUNTER — TRANSCRIPTION ENCOUNTER (OUTPATIENT)
Age: 73
End: 2023-01-27

## 2023-01-27 VITALS
TEMPERATURE: 98 F | OXYGEN SATURATION: 97 % | DIASTOLIC BLOOD PRESSURE: 71 MMHG | HEIGHT: 61 IN | SYSTOLIC BLOOD PRESSURE: 152 MMHG | WEIGHT: 108.25 LBS | RESPIRATION RATE: 16 BRPM | HEART RATE: 45 BPM

## 2023-01-27 VITALS
SYSTOLIC BLOOD PRESSURE: 136 MMHG | DIASTOLIC BLOOD PRESSURE: 65 MMHG | RESPIRATION RATE: 17 BRPM | TEMPERATURE: 98 F | OXYGEN SATURATION: 97 % | HEART RATE: 59 BPM

## 2023-01-27 DIAGNOSIS — Z98.890 OTHER SPECIFIED POSTPROCEDURAL STATES: Chronic | ICD-10-CM

## 2023-01-27 DIAGNOSIS — I77.0 ARTERIOVENOUS FISTULA, ACQUIRED: Chronic | ICD-10-CM

## 2023-01-27 LAB
ALBUMIN SERPL ELPH-MCNC: 3.6 G/DL — SIGNIFICANT CHANGE UP (ref 3.3–5)
ALP SERPL-CCNC: 165 U/L — HIGH (ref 40–120)
ALT FLD-CCNC: 17 U/L — SIGNIFICANT CHANGE UP (ref 10–45)
ANION GAP SERPL CALC-SCNC: 12 MMOL/L — SIGNIFICANT CHANGE UP (ref 5–17)
AST SERPL-CCNC: 27 U/L — SIGNIFICANT CHANGE UP (ref 10–40)
BILIRUB SERPL-MCNC: 0.7 MG/DL — SIGNIFICANT CHANGE UP (ref 0.2–1.2)
BUN SERPL-MCNC: 59 MG/DL — HIGH (ref 7–23)
CALCIUM SERPL-MCNC: 9.3 MG/DL — SIGNIFICANT CHANGE UP (ref 8.4–10.5)
CHLORIDE SERPL-SCNC: 100 MMOL/L — SIGNIFICANT CHANGE UP (ref 96–108)
CO2 SERPL-SCNC: 30 MMOL/L — SIGNIFICANT CHANGE UP (ref 22–31)
CREAT SERPL-MCNC: 6 MG/DL — HIGH (ref 0.5–1.3)
EGFR: 7 ML/MIN/1.73M2 — LOW
GLUCOSE SERPL-MCNC: 92 MG/DL — SIGNIFICANT CHANGE UP (ref 70–99)
POTASSIUM SERPL-MCNC: 5.5 MMOL/L — HIGH (ref 3.5–5.3)
POTASSIUM SERPL-SCNC: 5.5 MMOL/L — HIGH (ref 3.5–5.3)
PROT SERPL-MCNC: 6.8 G/DL — SIGNIFICANT CHANGE UP (ref 6–8.3)
SODIUM SERPL-SCNC: 142 MMOL/L — SIGNIFICANT CHANGE UP (ref 135–145)

## 2023-01-27 RX ORDER — SODIUM CHLORIDE 9 MG/ML
1000 INJECTION, SOLUTION INTRAVENOUS
Refills: 0 | Status: DISCONTINUED | OUTPATIENT
Start: 2023-01-27 | End: 2023-01-27

## 2023-01-27 RX ORDER — ONDANSETRON 8 MG/1
4 TABLET, FILM COATED ORAL ONCE
Refills: 0 | Status: DISCONTINUED | OUTPATIENT
Start: 2023-01-27 | End: 2023-01-27

## 2023-01-27 RX ORDER — NIFEDIPINE 30 MG
1 TABLET, EXTENDED RELEASE 24 HR ORAL
Qty: 0 | Refills: 0 | DISCHARGE

## 2023-01-27 RX ORDER — ACETAMINOPHEN 500 MG
1000 TABLET ORAL ONCE
Refills: 0 | Status: DISCONTINUED | OUTPATIENT
Start: 2023-01-27 | End: 2023-01-27

## 2023-01-27 RX ORDER — LOSARTAN POTASSIUM 100 MG/1
1 TABLET, FILM COATED ORAL
Qty: 0 | Refills: 0 | DISCHARGE

## 2023-01-27 RX ORDER — LAMOTRIGINE 25 MG/1
1 TABLET, ORALLY DISINTEGRATING ORAL
Qty: 0 | Refills: 0 | DISCHARGE

## 2023-01-27 RX ORDER — CARVEDILOL PHOSPHATE 80 MG/1
0 CAPSULE, EXTENDED RELEASE ORAL
Qty: 0 | Refills: 0 | DISCHARGE

## 2023-01-27 RX ADMIN — SODIUM CHLORIDE 100 MILLILITER(S): 9 INJECTION, SOLUTION INTRAVENOUS at 13:15

## 2023-01-27 NOTE — ASU PREOP CHECKLIST - WEIGHT IN KG
TCM call not complete after 3 outreach attempts.  Per protocol, no further contact attempts will be made by Patient Care Coordinators.  PCP [facility] scheduled 1/28/19.  TCM applicable until 1/31/19.   49.1

## 2023-01-27 NOTE — ASU DISCHARGE PLAN (ADULT/PEDIATRIC) - ASU DC SPECIAL INSTRUCTIONSFT
AMBULATORY ELECTROCONVULSIVE THERAPY (ECT) DISCHARGE INSTRUCTIONS    Condition:  Stable for discharge to home.    1. Activities: Rest the day of your treatment.  It is normal to experience drowsiness and possibly some confusion following ECT and anesthesia.  DO NOT consume alcohol, operative machinery, drive or make important personal, business or legal decisions for at least 24 hours.    2. Diet: Begin with a light meal following ECT and progress as tolerated to a regular diet (ie. resume your normal food intake).    3. Medications: Resume medications as prescribed by your outpatient physicians.  Mild aches or headache may be experienced post treatment.  This is usually relieved  by Tylenol or other non-aspirin medications (take only amounts prescribed by each ).      4. Emergencies:  Please first call your regular outpatient psychiatric provider (ie. psychopharmacologist) for any changes in your psychiatric symptoms.   If you are unable to reach your doctor, you go directly to your local Emergency Room.      5. Routine Follow-Up Information:   Please call the ECT Department if you are unable to make your next appointment or have any questions about your ECT treatments.  You may also contact Adam Banks MD at [email or web address] or [phone] for non-urgent clinical questions about your treatment course.      REMEMBER TO NOT EAT ANYTHING FOR 8 HOURS BEFORE YOUR NEXT TREATMENT.   YOU MAY DRINK ONLY CLEAR LIQUIDS UP TO 3 HOURS BEFORE YOUR TREATMENT (eg. WATER, APPLE JUICE, GINGER ALE).    SOMEONE OVER 18 YEARS OLD MUST ACCOMPANY YOU HOME FROM THE HOSPITAL ON THE DAY OF YOUR TREATMENT.    IF YOU HAVE ANY QUESTIONS ABOUT YOUR NEXT TREATMENT, COVID TESTING, OR EXPIRATION DATES OF YOUR MEDICAL CLEARANCE, PLEASE CONTACT:     ECT Department:  Lakshmi Thomas” Angelina [phone] office; [phone] fax      Your next ECT treatment will be on: Friday 2/10/23 (2 weeks)

## 2023-01-27 NOTE — ECT TREATMENT NOTE - NSECTCOMMENTS_PSY_ALL_CORE
Reports that she is "depressed...and only 40% better.  I would like to be 70%" Denies change in sleep or appetite, concentration ok, no SI.  Observes that it is Holocaust Remembrance Day and that she is typically sad on this day. Does not "miss" having ECT, but is also relieved not to have treatment for several weeks.    BDI=9

## 2023-02-15 NOTE — ASU DISCHARGE PLAN (ADULT/PEDIATRIC) - ASU DC SPECIAL INSTRUCTIONSFT
Problem: Discharge Planning  Goal: Discharge to home or other facility with appropriate resources  Outcome: Progressing  Flowsheets (Taken 2/15/2023 0819)  Discharge to home or other facility with appropriate resources:   Identify barriers to discharge with patient and caregiver   Arrange for needed discharge resources and transportation as appropriate   Identify discharge learning needs (meds, wound care, etc)   Discharge plan is home alone. Will continue to monitor for needs. Problem: Pain  Goal: Verbalizes/displays adequate comfort level or baseline comfort level  Outcome: Progressing  Flowsheets (Taken 2/15/2023 1109)  Verbalizes/displays adequate comfort level or baseline comfort level:   Encourage patient to monitor pain and request assistance   Assess pain using appropriate pain scale   Administer analgesics based on type and severity of pain and evaluate response   Implement non-pharmacological measures as appropriate and evaluate response   Pain Assessment: 0-10  Pain Level: 9   Patient's Stated Pain Goal: 6   Is pain goal met at this time? No     Non-Pharmaceutical Pain Intervention(s): Repositioned, Rest    Problem: Neurosensory - Adult  Goal: Achieves maximal functionality and self care  Outcome: Progressing  Flowsheets (Taken 2/15/2023 0819)  Achieves maximal functionality and self care: Encourage and assist patient to increase activity and self care with guidance from physical therapy/occupational therapy   Patient able to get up with steady gait. Problem: Skin/Tissue Integrity - Adult  Goal: Skin integrity remains intact  Outcome: Progressing  Flowsheets (Taken 2/15/2023 0819)  Skin Integrity Remains Intact: Monitor for areas of redness and/or skin breakdown   No skin breakdown this shift. Patient able to turn per self and being offered assistance with turning. Patients states understanding of repositioning every two hours.     Problem: Musculoskeletal - Adult  Goal: Return mobility to safest level of function  Outcome: Progressing  Flowsheets (Taken 2/15/2023 0819)  Return Mobility to Safest Level of Function:   Assess patient stability and activity tolerance for standing, transferring and ambulating with or without assistive devices   Assist with transfers and ambulation using safe patient handling equipment as needed   Obtain physical therapy/occupational therapy consults as needed   Instruct patient/family in ordered activity level   Patient assisted with transfers to ensure that the patient ambulates safely with additional equipment. Problem: Gastrointestinal - Adult  Goal: Maintains adequate nutritional intake  Outcome: Progressing  Flowsheets (Taken 2/15/2023 0819)  Maintains adequate nutritional intake: Monitor percentage of each meal consumed  Patient on full liquid diet currently. Will monitor how patient tolerates. Care plan reviewed with patient. Patient verbalize understanding of the plan of care and contribute to goal setting. AMBULATORY ELECTROCONVULSIVE THERAPY (ECT) DISCHARGE INSTRUCTIONS    Condition:  Stable for discharge to home.    1. Activities: Rest the day of your treatment.  It is normal to experience drowsiness and possibly some confusion following ECT and anesthesia.  DO NOT consume alcohol, operative machinery, drive or make important personal, business or legal decisions for at least 24 hours.    2. Diet: Begin with a light meal following ECT and progress as tolerated to a regular diet (ie. resume your normal food intake).    3. Medications: Resume medications as prescribed by your outpatient physicians.  Mild aches or headache may be experienced post treatment.  This is usually relieved  by Tylenol or other non-aspirin medications (take only amounts prescribed by each ).      4. Emergencies:  Please first call your regular outpatient psychiatric provider (ie. psychopharmacologist) for any changes in your psychiatric symptoms.   If you are unable to reach your doctor, you go directly to your local Emergency Room.      5. Routine Follow-Up Information:   Please call the ECT Department if you are unable to make your next appointment or have any questions about your ECT treatments.  You may also contact Adam Banks MD at [email or web address] or [phone] for non-urgent clinical questions about your treatment course.      REMEMBER TO NOT EAT ANYTHING FOR 8 HOURS BEFORE YOUR NEXT TREATMENT.   YOU MAY DRINK ONLY CLEAR LIQUIDS UP TO 3 HOURS BEFORE YOUR TREATMENT (eg. WATER, APPLE JUICE, GINGER ALE).    SOMEONE OVER 18 YEARS OLD MUST ACCOMPANY YOU HOME FROM THE HOSPITAL ON THE DAY OF YOUR TREATMENT.    IF YOU HAVE ANY QUESTIONS ABOUT YOUR NEXT TREATMENT, COVID TESTING, OR EXPIRATION DATES OF YOUR MEDICAL CLEARANCE, PLEASE CONTACT:     ECT Department:  [facility] [place] [phone] office; [phone] fax      Your next ECT treatment will be on: Wed Sept 7, 2022

## 2023-04-04 NOTE — ASU PREOP CHECKLIST - BP NONINVASIVE SYSTOLIC (MM HG)
141 Peng Advancement Flap Text: The defect edges were debeveled with a #15 scalpel blade. Given the location of the defect, shape of the defect and the proximity to free margins a Peng advancement flap was deemed most appropriate. Using a sterile surgical marker, an appropriate advancement flap was drawn incorporating the defect and placing the expected incisions within the relaxed skin tension lines where possible. The area thus outlined was incised deep to adipose tissue with a #15 scalpel blade. The skin margins were undermined to an appropriate distance in all directions utilizing iris scissors. Following this, the designed flap was advanced and carried over into the primary defect and sutured into place.

## 2023-05-10 NOTE — ECT TREATMENT NOTE - NSECTCPTCODE_PSY_ALL_CORE
34738 (ECT-Electroconvulsive Therapy) Xolair Pregnancy And Lactation Text: This medication is Pregnancy Category B and is considered safe during pregnancy. This medication is excreted in breast milk.

## 2023-05-23 NOTE — PRE-ANESTHESIA EVALUATION ADULT - BSA (M2)
Instructions: This plan will send the code FBSD to the PM system.  DO NOT or CHANGE the price. Price (Do Not Change): 0.00 Detail Level: Simple 1.41

## 2023-07-28 NOTE — ASU PATIENT PROFILE, ADULT - NSSUBSTANCEUSE_GEN_ALL_CORE_SD
[facility] patient. Patient requesting a referral for HPV outbreak (genital warts). Please advise on any recommendation for patient. Writer does not believe referral to Urology would be appropriate.    never used [Negative] : Heme/Lymph

## 2023-09-07 ENCOUNTER — APPOINTMENT (OUTPATIENT)
Dept: OTOLARYNGOLOGY | Facility: CLINIC | Age: 73
End: 2023-09-07
Payer: MEDICARE

## 2023-09-07 VITALS — BODY MASS INDEX: 20.62 KG/M2 | WEIGHT: 105 LBS | HEIGHT: 60 IN

## 2023-09-07 DIAGNOSIS — H92.09 OTALGIA, UNSPECIFIED EAR: ICD-10-CM

## 2023-09-07 DIAGNOSIS — H90.A22 SENSORINEURAL HEARING LOSS, UNILATERAL, LEFT EAR, WITH RESTRICTED HEARING ON THE CONTRALATERAL SIDE: ICD-10-CM

## 2023-09-07 DIAGNOSIS — H93.299 OTHER ABNORMAL AUDITORY PERCEPTIONS, UNSPECIFIED EAR: ICD-10-CM

## 2023-09-07 DIAGNOSIS — J31.0 CHRONIC RHINITIS: ICD-10-CM

## 2023-09-07 DIAGNOSIS — R42 DIZZINESS AND GIDDINESS: ICD-10-CM

## 2023-09-07 DIAGNOSIS — H90.3 SENSORINEURAL HEARING LOSS, BILATERAL: ICD-10-CM

## 2023-09-07 DIAGNOSIS — M26.609 UNSPECIFIED TEMPOROMANDIBULAR JOINT DISORDER: ICD-10-CM

## 2023-09-07 PROCEDURE — 99213 OFFICE O/P EST LOW 20 MIN: CPT

## 2023-09-07 PROCEDURE — 92567 TYMPANOMETRY: CPT

## 2023-09-07 PROCEDURE — 92557 COMPREHENSIVE HEARING TEST: CPT

## 2023-09-07 NOTE — HISTORY OF PRESENT ILLNESS
[de-identified] : CHUY FRANKS is a 72 year old female who comes in complaining of several days of vertigo.  It has improved and she is just having a little bit of dizziness today.  She has some right-sided otalgia that is relatively mild and it is worse when she lays on it.  She is in the middle of dental work that had to be temporarily stopped because of her kidney transplant.  She had this on June 29.  I had last seen her about 4 years ago and she has the history of hearing losses rhinitis reflux and temporomandibular joint dysfunction.  The patient had no other ear nose or throat complaints at this visit.

## 2023-09-07 NOTE — REVIEW OF SYSTEMS
[Ear Pain] : ear pain [Vertigo] : vertigo [Negative] : Heme/Lymph [de-identified] : nasal obstruction

## 2023-09-07 NOTE — ASSESSMENT
[FreeTextEntry1] : It was my impression that she has a resolving labyrinthitis.  I reviewed this with her.  I recommended vestibular exercises and vestibular therapy if this persists.  Otherwise I reassured her hearing was good and I recommended repeat evaluation in a year or as needed Her ear discomfort is from her temporomandibular joint and this was reviewed.  She is getting dental work.  I recommended warm compresses and soft diet and would like to see if this fails to respond

## 2023-09-07 NOTE — CONSULT LETTER
[FreeTextEntry2] : LAURA MIX [FreeTextEntry1] :   Dear  Dr. LAURA MIX,  I had the pleasure of seeing your patient today.   Please see my note below.   Thank you very much for allowing me to participate in the care of your patient.  Sincerely,  Homer Bernal MD [facility]  [facility]

## 2023-09-07 NOTE — PHYSICAL EXAM
[FreeTextEntry1] : General: The patient was alert and oriented and in no distress. Voice was clear.  Oral cavity: The oral mucosa was normal. The oral and base of tongue were clear and without mass. The gingival and buccal mucosa were moist and without lesions. The palate moved well. There was no cleft to the palate. There appeared to be good salivary flow.   There was no pus, erythema or mass in the oral cavity. She has multiple missing dentition and a crossbite without significant discomfort  Nose:  The external nose had no significant deformity.  There was no facial tenderness.  On anterior rhinoscopy, the nasal mucosa was clear.  The anterior septum was midline.  There were no visualized polyps purulence  or masses.  Neuro: Neurologically, the patient was awake, alert, and oriented to person, place and time. There were no obvious focal neurologic abnormalities.  Cranial nerves II through XII were grossly intact. Finger-nose-finger rapid alternating motion were normal and there was no evidence of nystagmus  Ears: The external ears were normal without deformity. The ear canals were clear. The tympanic membranes were intact and normal. [de-identified] : A complete audiogram was ordered, done and reviewed with the patient.

## 2023-09-08 PROBLEM — H90.3 SENSORINEURAL HEARING LOSS (SNHL) OF BOTH EARS: Status: ACTIVE | Noted: 2021-05-04

## 2024-01-08 NOTE — ASU PATIENT PROFILE, ADULT - PATIENT KNOW
Right Spj Reflux (Sec): no reflux
Use Ssv Or Lsv: LSV
Detail Level: Zone
Use - 'see Attached Documentation' Verbiage?: No
See Attached Documentation Text: Please refer to the attached ultrasound documentation for complete details of the procedure and the venous findings.
yes

## 2024-02-12 ENCOUNTER — EMERGENCY (EMERGENCY)
Facility: HOSPITAL | Age: 74
LOS: 1 days | Discharge: HOME | End: 2024-02-12
Attending: EMERGENCY MEDICINE | Admitting: EMERGENCY MEDICINE
Payer: MEDICARE

## 2024-02-12 VITALS
RESPIRATION RATE: 18 BRPM | SYSTOLIC BLOOD PRESSURE: 116 MMHG | OXYGEN SATURATION: 96 % | WEIGHT: 110.01 LBS | TEMPERATURE: 97 F | HEART RATE: 73 BPM | DIASTOLIC BLOOD PRESSURE: 75 MMHG

## 2024-02-12 VITALS
DIASTOLIC BLOOD PRESSURE: 84 MMHG | SYSTOLIC BLOOD PRESSURE: 109 MMHG | OXYGEN SATURATION: 99 % | HEART RATE: 61 BPM | RESPIRATION RATE: 17 BRPM | TEMPERATURE: 98 F

## 2024-02-12 DIAGNOSIS — E78.5 HYPERLIPIDEMIA, UNSPECIFIED: ICD-10-CM

## 2024-02-12 DIAGNOSIS — S09.90XA UNSPECIFIED INJURY OF HEAD, INITIAL ENCOUNTER: ICD-10-CM

## 2024-02-12 DIAGNOSIS — Z23 ENCOUNTER FOR IMMUNIZATION: ICD-10-CM

## 2024-02-12 DIAGNOSIS — Z98.890 OTHER SPECIFIED POSTPROCEDURAL STATES: Chronic | ICD-10-CM

## 2024-02-12 DIAGNOSIS — I77.0 ARTERIOVENOUS FISTULA, ACQUIRED: Chronic | ICD-10-CM

## 2024-02-12 DIAGNOSIS — S00.83XA CONTUSION OF OTHER PART OF HEAD, INITIAL ENCOUNTER: ICD-10-CM

## 2024-02-12 DIAGNOSIS — Z94.0 KIDNEY TRANSPLANT STATUS: ICD-10-CM
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
## 2024-02-12 DIAGNOSIS — N18.6 END STAGE RENAL DISEASE: ICD-10-CM

## 2024-02-12 DIAGNOSIS — F31.9 BIPOLAR DISORDER, UNSPECIFIED: ICD-10-CM

## 2024-02-12 DIAGNOSIS — Z99.2 DEPENDENCE ON RENAL DIALYSIS: ICD-10-CM

## 2024-02-12 PROCEDURE — 99284 EMERGENCY DEPT VISIT MOD MDM: CPT

## 2024-02-12 PROCEDURE — 70450 CT HEAD/BRAIN W/O DYE: CPT | Mod: 26,MA

## 2024-02-12 PROCEDURE — 99284 EMERGENCY DEPT VISIT MOD MDM: CPT | Mod: 25

## 2024-02-12 PROCEDURE — 90471 IMMUNIZATION ADMIN: CPT

## 2024-02-12 PROCEDURE — 90715 TDAP VACCINE 7 YRS/> IM: CPT

## 2024-02-12 PROCEDURE — 70450 CT HEAD/BRAIN W/O DYE: CPT | Mod: MA

## 2024-02-12 RX ORDER — TETANUS TOXOID, REDUCED DIPHTHERIA TOXOID AND ACELLULAR PERTUSSIS VACCINE, ADSORBED 5; 2.5; 8; 8; 2.5 [IU]/.5ML; [IU]/.5ML; UG/.5ML; UG/.5ML; UG/.5ML
0.5 SUSPENSION INTRAMUSCULAR ONCE
Refills: 0 | Status: COMPLETED | OUTPATIENT
Start: 2024-02-12 | End: 2024-02-12

## 2024-02-12 RX ADMIN — TETANUS TOXOID, REDUCED DIPHTHERIA TOXOID AND ACELLULAR PERTUSSIS VACCINE, ADSORBED 0.5 MILLILITER(S): 5; 2.5; 8; 8; 2.5 SUSPENSION INTRAMUSCULAR at 15:46

## 2024-02-12 NOTE — ED ADULT NURSE NOTE - NSFALLHARMRISKINTERV_ED_ALL_ED

## 2024-02-12 NOTE — ED PROVIDER NOTE - NSFOLLOWUPINSTRUCTIONS_ED_ALL_ED_FT
You were evaluated in the ED after a mechanical fall. You received a tetanus vaccination, good for the next 10 years to prevent wound infection from Tetanus.     You had a CT of the brain which showed no acute injuries.    The abrasion (scrape) on your forehead will heal on its own. You can apply topical antibiotic ointment, such as Bacitracin or Neosporin.     Follow up with your regular medical doctor.     Closed Head Injury    A closed head injury is an injury to your head that may or may not involve a traumatic brain injury (TBI). Symptoms of TBI can be short or long lasting and include headache, dizziness, interference with memory or speech, fatigue, confusion, changes in sleep, mood changes, nausea, depression/anxiety, and dulling of senses. Make sure to obtain proper rest which includes getting plenty of sleep, avoiding excessive visual stimulation, and avoiding activities that may cause physical or mental stress. Avoid any situation where there is potential for another head injury, including sports.    SEEK IMMEDIATE MEDICAL CARE IF YOU HAVE ANY OF THE FOLLOWING SYMPTOMS: unusual drowsiness, vomiting, severe dizziness, seizures, lightheadedness, muscular weakness, different pupil sizes, visual changes, or clear or bloody discharge from your ears or nose.     Abrasion  An abrasion is a cut or a scrape on the surface of the skin. An abrasion does not go through all the layers of the skin. It is important to care for an abrasion properly to prevent infection.    What are the causes?  This condition is caused by rubbing your skin on something or falling on a surface, such as the ground. When your skin rubs on something, some layers of skin may rub off.    What are the signs or symptoms?  The main symptom of this condition is a cut or a scrape. The cut or scrape may be bleeding, or it may appear red or pink. If your abrasion was caused by a fall, there may be a bruise under your cut or scrape.    How is this diagnosed?  An abrasion is diagnosed with a physical exam.    How is this treated?  Treatment for this condition depends on how large and deep the abrasion is. In most cases:  Your abrasion will be cleaned with water and mild soap. This is done to remove any dirt or debris (such as tiny bits of glass or rock) that may be stuck in your wound.  An antibiotic ointment may be applied to your abrasion to help prevent infection.  A bandage (dressing) may be placed on your abrasion to keep it clean.  You may also need a tetanus shot.    Follow these instructions at home:  Medicines    Take or apply over-the-counter and prescription medicines only as told by your health care provider.  If you were prescribed an antibiotic medicine, use it as told by your health care provider. Do not stop using the antibiotic even if you start to feel better.  Wound care    Clean your wound 1 or 2 times a day, or as told by your health care provider. To do this:  Wash your hands for at least 20 seconds with mild soap and water. Do this before and after you clean your wound.  Wash your wound with mild soap and water and then rinse off the soap.  Pat your wound dry with a clean towel. Do not rub your wound.  Keep your dressing clean and dry as told by your health care provider.  There are many different ways to close and cover a wound. Follow instructions from your health care provider about caring for your wound and about changing and removing your dressing. You may have to change your dressing one or more times a day, or as directed by your health care provider.  Check your wound every day for signs of infection. Check for:  Redness, especially a red streak that spreads out from your wound.  Swelling or increased pain.  Warmth.  Blood, fluid, pus, or a bad smell.  Managing pain and swelling      If directed, put ice on the injured area. To do this:  Put ice in a plastic bag.  Place a towel between your skin and the bag.  Leave the ice on for 20 minutes, 2–3 times a day.  Remove the ice if your skin turns bright red. This is very important. If you cannot feel pain, heat, or cold, you have a greater risk of damage to the area.  If possible, raise (elevate) the injured area above the level of your heart while you are sitting or lying down.  General instructions    Do not take baths, swim, or use a hot tub until your health care provider approves. Ask your doctor about taking showers or sponge baths.  Keep all follow-up visits. This is important.  Contact a health care provider if:  You received a tetanus shot, and you have swelling, severe pain, redness, or bleeding at your injection site.  Your pain is not controlled with medicine.  You have a fever.  You have any of these signs of infection:  Redness, swelling, or more pain around your wound.  Warmth coming from your wound.  Blood, fluid, pus, or a bad smell coming from your wound.  Get help right away if:  You have a red streak spreading away from your wound.  Summary  An abrasion is a cut or a scrape on the surface of the skin. Care for your abrasion properly to prevent infection.  Clean your wound with mild soap and water 1 or 2 times a day or as often as told. Follow instructions from your health care provider about taking medicines and changing your bandage (dressing).  Contact your health care provider if you have a fever or if you have redness, swelling, or more pain around your wound.  Contact your health care provider if you have warmth, blood, fluid, pus, or a bad smell coming from your wound.  Get help right away if there is a red streak spreading away from your wound.  This information is not intended to replace advice given to you by your health care provider. Make sure you discuss any questions you have with your health care provider.

## 2024-02-12 NOTE — ED ADULT NURSE NOTE - OBJECTIVE STATEMENT
Pt is a 72y/o F presenting to the ED w/ c/o of a mechanical trip/fall over walker yesterday, +head strike to back of head, +thinners, unsure of LOC. Pt presents w/ pain to back of head/middle of back, laceration to R forehead, bleeding controlled w/ gauze. Pt went to PCP this morning, sent into ED. Pt UPGRADED to Dr. Mc d/t low BP reading in triage. Pt currently denies CP, SOB, dizziness/lightheadedness, weakness, N/V/D, diff urinating, numbness/tingling. Pt has PMHx pacemaker insertion, HTN (takes meds), ESRD, HLD, anxiety/depression. Pt A/Ox3, speaking in clear/complete sentences. Respirations easy, even and unlabored on RA, saturations > 95%. EKG completed in triage, pt placed on continuous cardiac monitor and pulse ox. Pt placed in gown, bilat 18g IVs placed. Uses walker @ home.
Pt is a 72y/o F presenting to the ED w/ c/o of mechanical trip/fall going up stairs today @ PCP office, sustained R forehead lac, bleeding controlled w/ gauze, now c/o of pain to site. +head strike, -LOC, -thinners. Pt denies dizziness/lightheadedness, N/V/D, CP, SOB, weakness, numbness/tingling. 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 Pt A/Ox3, speaking in clear/complete sentences. Respirations easy/even and unlabored on RA. Pt resting comfortably in chair.

## 2024-02-12 NOTE — ED ADULT NURSE NOTE - PLAN OF CARE
Explanation of exam/test/Fall precautions/Position of comfort
Explanation of exam/test/Fall precautions/Bedside visitors/Position of comfort/Side rails

## 2024-02-12 NOTE — ED ADULT NURSE NOTE - NS ED NURSE LEVEL OF CONSCIOUSNESS ORIENTATION
Oriented - self; Oriented - place; Oriented - time/Age appropriate behavior
Oriented - self; Oriented - place; Oriented - time/Age appropriate behavior

## 2024-02-12 NOTE — ED ADULT NURSE NOTE - NS ED NOTE  TALK SOMEONE YN
Pt complaints for breast pain/tendernes. Pt reports bleeding with constipation, unsure if it was hemorrhoid. Pt with with increasing heart rate. 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
No
No

## 2024-02-12 NOTE — ED ADULT NURSE NOTE - CHPI ED NUR SYMPTOMS NEG
no abrasion/no bleeding/no confusion/no deformity/no fever/no numbness/no tingling/no vomiting/no weakness
no confusion/no deformity/no fever/no loss of consciousness/no numbness/no tingling/no vomiting/no weakness

## 2024-02-12 NOTE — ED ADULT NURSE NOTE - NSFALLRISKFACTORS_ED_ALL_ED
Coagulation: Bleeding disorder either through use of anticoagulants or underlying clinical condition(s)
No indicators present

## 2024-02-12 NOTE — ED ADULT TRIAGE NOTE - CHIEF COMPLAINT QUOTE
pt presents to ER after tripping over her walker and falling in the street. pt has laceration to R forehead. bleeding controlled with gauze dressing. pt not on blood thinners. denies LOC, dizziness and n/v. c/o pain to laceration site.

## 2024-02-12 NOTE — ED ADULT NURSE NOTE - CHIEF COMPLAINT QUOTE
pt presents to ER after tripping over her walker and falling in the street. pt has laceration to R forehead. bleeding controlled with gauze dressing. pt not on blood thinners. denies LOC, dizziness and n/v. c/o pain to laceration site.
pt presents to ER after tripping over her walker and falling in the street. pt has laceration to R forehead. bleeding controlled with gauze dressing. pt not on blood thinners. denies LOC, dizziness and n/v. c/o pain to laceration site.

## 2024-02-12 NOTE — ED PROVIDER NOTE - PHYSICAL EXAMINATION
2 x 1.5 cm  frontotemporal hematoma w/ overlying abrasion, no active bleeding, mildly ttp. no crepitus or instability Constitutional: Well appearing, well nourished, awake, alert, oriented to person, place, time/situation and in no apparent distress  Head 2 x 1.5 cm  frontotemporal hematoma w/ overlying abrasion, no active bleeding, mildly ttp. no crepitus or instability  ENMT: Airway patent. Normal MM. facial bones unremarkable. no facial swelling / ttp / crepitus. no malocclusion. no epistaxis. No Raccoon's eyes or Guzmán signs.   Eyes: Clear bilaterally, PERRL, EOMI  Chest: no trauma  Cardiac: Normal rate, regular rhythm.    Respiratory: Breathes easily. No increased WOB, tachypnea, hypoxia, or accessory mm use. Pt speaks in full sentences.   Musculoskeletal: No midline c-spine ttp. FROM neck w/o midline pain. Range of motion is not limited. FROM all joints x 4 ext w/o dec ROM, pain, or signs of trauma  Neuro: Alert & Oriented x 3. CN II-XII intact. No facial droop. Clear speech. [place] w/ 5/5 strength x 4 ext. Normal sensation. No pronator drift. Normal gait  Skin: Skin normal color for race, warm, dry and intact. No evidence of rash.  Psych: Alert and oriented to person, place, time/situation. normal mood and affect. Constitutional: Well appearing, well nourished, awake, alert, oriented to person, place, time/situation and in no apparent distress  Head 2 x 1.5 cm R frontotemporal hematoma w/ overlying abrasion, no active bleeding, mildly ttp. no crepitus or instability  ENMT: Airway patent. Normal MM. facial bones unremarkable. no facial swelling / ttp / crepitus. no malocclusion. no epistaxis. No Raccoon's eyes or Guzmán signs.   Eyes: Clear bilaterally, PERRL, EOMI  Chest: no trauma  Cardiac: Normal rate, regular rhythm.    Respiratory: Breathes easily. No increased WOB, tachypnea, hypoxia, or accessory mm use. Pt speaks in full sentences.   Musculoskeletal: No midline c-spine ttp. FROM neck w/o midline pain. Range of motion is not limited. FROM all joints x 4 ext w/o dec ROM, pain, or signs of trauma  Neuro: Alert & Oriented x 3. CN II-XII intact. No facial droop. Clear speech. [place] w/ 5/5 strength x 4 ext. Normal sensation. No pronator drift. Normal gait  Skin: Skin normal color for race, warm, dry and intact. No evidence of rash.  Psych: Alert and oriented to person, place, time/situation. normal mood and affect.

## 2024-02-12 NOTE — ED ADULT NURSE NOTE - ED COMFORT CARE
Patient informed/Explanation of wait
Patient informed/Family informed/Explanation of wait/Warm blanket/Mouth care

## 2024-02-12 NOTE — ED PROVIDER NOTE - PATIENT PORTAL LINK FT
You can access the FollowMyHealth Patient Portal offered by [facility] by registering at the following website: [email or web address]. By joining [employer]’s FollowMyHealth portal, you will also be able to view your health information using other applications (apps) compatible with our system.

## 2024-02-12 NOTE — ED PROVIDER NOTE - OBJECTIVE STATEMENT
Pt w/ PMHx CKD prior ESRD on HD now s/p renal txp on immunosuppressants, bipolar d/o, anxiety, vertigo, HLD, presents s/p mechanical fall. She was going to an appt, the door was heavy to push, once she got through the door, she fell forward, hitting her head. No LOC. No AC use. no HA, n/v, dizziness midline neck pain Pt w/ PMHx CKD prior ESRD on HD now s/p renal txp on immunosuppressants, bipolar d/o, anxiety, vertigo, HLD, presents s/p mechanical fall. She was going to an appt, the door was heavy to push, once she got through the door, she fell forward, hitting her head. No LOC. No AC use. no HA, n/v, dizziness, nor midline neck pain.

## 2024-02-12 NOTE — ED ADULT NURSE NOTE - NSSUHOSCREENINGYN_ED_ALL_ED
Yes - the patient is able to be screened
Yes - the patient is able to be screened
Manual Repair Warning Statement: We plan on removing the manually selected variable below in favor of our much easier automatic structured text blocks found in the previous tab. We decided to do this to help make the flow better and give you the full power of structured data. Manual selection is never going to be ideal in our platform and I would encourage you to avoid using manual selection from this point on, especially since I will be sunsetting this feature. It is important that you do one of two things with the customized text below. First, you can save all of the text in a word file so you can have it for future reference. Second, transfer the text to the appropriate area in the Library tab. Lastly, if there is a flap or graft type which we do not have you need to let us know right away so I can add it in before the variable is hidden. No need to panic, we plan to give you roughly 6 months to make the change.

## 2024-02-12 NOTE — ED PROVIDER NOTE - NS ED ROS FT
Constitutional: No fever or chills.   Eyes: No pain, blurry vision, or discharge.  ENMT: No neck pain or stiffness.  Cardiac: No chest pain, SOB or edema. No chest pain with exertion.  Respiratory: No cough or respiratory distress. No hemoptysis. No history of asthma or RAD.  MS: No myalgia, muscle weakness, joint pain or back pain.  Neuro: No focal numbness or weakness. no LOC  Skin: + abrasions  Except as documented in the HPI, all other systems are negative.

## 2024-02-12 NOTE — ED ADULT NURSE NOTE - NSFALLRISKINTERV_ED_ALL_ED

## 2024-02-12 NOTE — ED PROVIDER NOTE - CLINICAL SUMMARY MEDICAL DECISION MAKING FREE TEXT BOX
Pt presents s/p mechanical fall w/o LOC. No AP or AC use. Abrasions to R frontotemporal region not requiring repair. Mild underlying hematoma. No other signs of trauma. No C-spine pain or ttp. Neuro intact. CTH to r/o injury. Update TDaP. Wound care discussed. Anticipate d/c if CT neg.

## 2024-02-29 ENCOUNTER — APPOINTMENT (OUTPATIENT)
Dept: ORTHOPEDIC SURGERY | Facility: CLINIC | Age: 74
End: 2024-02-29
Payer: MEDICARE

## 2024-02-29 VITALS — BODY MASS INDEX: 20.62 KG/M2 | WEIGHT: 105 LBS | HEIGHT: 60 IN

## 2024-02-29 DIAGNOSIS — R53.81 OTHER MALAISE: ICD-10-CM

## 2024-02-29 PROCEDURE — 99203 OFFICE O/P NEW LOW 30 MIN: CPT

## 2024-03-05 NOTE — ASSESSMENT
Patient will last increase the dose of Lyrica to 50 mg p.o. twice daily. E prescribed today. Follow-up 1 month to reevaluate the status of her neuropathy. Discontinue oral potassium. [FreeTextEntry1] : Discussed at length with patient her desire mainly to have physical therapy to help improve some of her gait and endurance and a prescription was given if any increased pain or any onset of weakness she is to call the office and we will consider formal imaging and further evaluation however I feel most likely she will benefit from general physical therapy strengthening and conditioning

## 2024-03-05 NOTE — PHYSICAL EXAM
[de-identified] : On examination patient is able to ambulate with use of a walker there is normal sensation bilateral lower extremities as well as 5 out of 5 hip flexion knee flexion extension ankle flexion and dorsiflexion [de-identified] : Patient declines any imaging at this time

## 2024-03-05 NOTE — HISTORY OF PRESENT ILLNESS
[de-identified] : Patient is a new patient who reports back in June of last year she underwent a kidney transplant and has since after being immobilized and limited activity has difficulty in regards to general ambulation she denies any pain denies any weakness denies any paresthesias other complaints of gait and balance difficulty

## 2024-05-15 ENCOUNTER — APPOINTMENT (OUTPATIENT)
Dept: ORTHOPEDIC SURGERY | Facility: CLINIC | Age: 74
End: 2024-05-15
Payer: MEDICARE

## 2024-05-15 DIAGNOSIS — M54.6 PAIN IN THORACIC SPINE: ICD-10-CM

## 2024-05-15 DIAGNOSIS — M54.50 LOW BACK PAIN, UNSPECIFIED: ICD-10-CM

## 2024-05-15 PROCEDURE — 72110 X-RAY EXAM L-2 SPINE 4/>VWS: CPT

## 2024-05-15 PROCEDURE — 99214 OFFICE O/P EST MOD 30 MIN: CPT

## 2024-05-15 RX ORDER — TIZANIDINE 2 MG/1
2 TABLET ORAL
Qty: 40 | Refills: 1 | Status: ACTIVE | COMMUNITY
Start: 2024-05-15 | End: 1900-01-01

## 2024-05-15 RX ORDER — TIZANIDINE 2 MG/1
2 TABLET ORAL EVERY 8 HOURS
Qty: 42 | Refills: 0 | Status: ACTIVE | COMMUNITY
Start: 2024-05-15 | End: 1900-01-01

## 2024-05-16 NOTE — ASSESSMENT
[FreeTextEntry1] : 72 y/o female presenting with acute mid/low left sided back pain that began after a fall 2 days ago. Her pain does not radiate. This is the first time she is being evaluated. She has been taking tylenol with minimal relief. She is recovering from fistula reversal and has hx of kidney transplant.  She denies radicular pain, recent illness, fevers, numbness, saddle anesthesia, urinary retention or fecal incontinence. Start tizanidine. Continue tylenol. The patient was given a referral for physical therapy. Follow up in 3-4 weeks. We discussed red flag symptoms that would require emergent evaluation. She knows to call with any questions or concerns or if her symptoms acutely worsen.

## 2024-05-16 NOTE — HISTORY OF PRESENT ILLNESS
[de-identified] : 74 y/o female presenting with acute mid/low left sided back pain that began after a fall 2 days ago. Her pain does not radiate. This is the first time she is being evaluated. She has been taking tylenol with minimal relief. She is recovering from fistula reversal and has hx of kidney transplant.  She denies radicular pain, recent illness, fevers, numbness, saddle anesthesia, urinary retention or fecal incontinence.

## 2024-05-16 NOTE — PHYSICAL EXAM
[de-identified] :  General: No acute distress, conversant, well-nourished. Head: Normocephalic, atraumatic Neck: trachea midline, FROM Heart: normotensive and normal rate and rhythm Lungs: No labored breathing Skin: No abrasions, no rashes, no edema Psych: Alert and oriented to person, place and time Extremities: no peripheral edema or digital cyanosis Vascular: warm and well perfused distally, palpable distal pulses    MSK: Lumbar spine: + tenderness to palpation.  No step-off, no deformity.   NEURO EXAM: Sensation Left L2  -  2/2            Left L3  -  2/2 Left L4  -  2/2 Left L5  -  2/2 Left S1  -  2/2   Right L2  -  2/2            Right L3  -  2/2 Right L4  -  2/2 Right L5  -  2/2 Right S1  -  2/2   Motor: Left L2 (hip flexion)                            5/5                Left L3 (knee extension)                   5/5                Left L4 (ankle dorsiflexion)                 5/5                Left L5 (long toe extensor)                5/5                Left S1 (ankle plantar flexion)           5/5   Right L2 (hip flexion)                            5/5                Right L3 (knee extension)                   5/5                Right L4 (ankle dorsiflexion)                 5/5                Right L5 (long toe extensor)                5/5                Right S1 (ankle plantar flexion)           5/5   Reflexes: Normal and symmetric Negative clonus.  Down-going Babinski.  [de-identified] : I ordered radiographs to evaluate the patient's symptoms.  Lumbar 4 view radiographs taken in the office today show no dislocation or fracture.  Lumbar spondylosis.  L4-L5 Spondylolisthesis.

## 2024-06-04 ENCOUNTER — APPOINTMENT (OUTPATIENT)
Dept: ORTHOPEDIC SURGERY | Facility: CLINIC | Age: 74
End: 2024-06-04
Payer: MEDICARE

## 2024-06-04 DIAGNOSIS — M43.16 SPONDYLOLISTHESIS, LUMBAR REGION: ICD-10-CM

## 2024-06-04 DIAGNOSIS — M54.50 LOW BACK PAIN, UNSPECIFIED: ICD-10-CM

## 2024-06-04 PROCEDURE — 99214 OFFICE O/P EST MOD 30 MIN: CPT

## 2024-06-04 NOTE — HISTORY OF PRESENT ILLNESS
[de-identified] : 74 y/o female followup with acute mid/low left sided back pain that began after a fall. Her pain does not radiate. She is recovering from fistula reversal and has hx of kidney transplant.  She denies radicular pain, recent illness, fevers, numbness, weakness, balance problems, saddle anesthesia, urinary retention or fecal incontinence. Patient is here for follow up. She has noticed some improvement in her back but would like to continue PT. She takes tylenol.

## 2024-06-04 NOTE — PHYSICAL EXAM
[de-identified] :  General: No acute distress, conversant, well-nourished. Head: Normocephalic, atraumatic Neck: trachea midline, FROM Heart: normotensive and normal rate and rhythm Lungs: No labored breathing Skin: No abrasions, no rashes, no edema Psych: Alert and oriented to person, place and time Extremities: no peripheral edema or digital cyanosis Gait: Normal gait. Can perform tandem gait.  Vascular: warm and well perfused distally, palpable distal pulses    MSK: Lumbar spine: + tenderness to palpation.  No step-off, no deformity.   NEURO EXAM: Sensation Left L2  -  2/2            Left L3  -  2/2 Left L4  -  2/2 Left L5  -  2/2 Left S1  -  2/2   Right L2  -  2/2            Right L3  -  2/2 Right L4  -  2/2 Right L5  -  2/2 Right S1  -  2/2   Motor: Left L2 (hip flexion)                            5/5                Left L3 (knee extension)                   5/5                Left L4 (ankle dorsiflexion)                 5/5                Left L5 (long toe extensor)                5/5                Left S1 (ankle plantar flexion)           5/5   Right L2 (hip flexion)                            5/5                Right L3 (knee extension)                   5/5                Right L4 (ankle dorsiflexion)                 5/5                Right L5 (long toe extensor)                5/5                Right S1 (ankle plantar flexion)           5/5   Reflexes: Normal and symmetric Negative clonus.  Down-going Babinski.  [de-identified] : Lumbar 4 view radiographs no dislocation or fracture.  Lumbar spondylosis.  L4-L5 Spondylolisthesis.

## 2024-06-04 NOTE — ASSESSMENT
[FreeTextEntry1] : 74 y/o female followup with acute mid/low left sided back pain that began after a fall. Her pain does not radiate. She is recovering from fistula reversal and has hx of kidney transplant.  She denies radicular pain, recent illness, fevers, numbness, weakness, balance problems, saddle anesthesia, urinary retention or fecal incontinence. Patient is here for follow up. She has noticed some improvement in her back but would like to continue PT. She takes tylenol. The patient was given a referral for physical therapy. Continue tylenol as needed. Follow up in 5-6 weeks. We discussed red flag symptoms that would require emergent evaluation. She knows to call with any questions or concerns or if her symptoms acutely worsen.

## 2024-08-08 ENCOUNTER — APPOINTMENT (OUTPATIENT)
Dept: ORTHOPEDIC SURGERY | Facility: CLINIC | Age: 74
End: 2024-08-08

## 2024-08-08 PROBLEM — M17.0 ARTHRITIS OF BOTH KNEES: Status: ACTIVE | Noted: 2024-08-08

## 2024-08-08 PROCEDURE — 73562 X-RAY EXAM OF KNEE 3: CPT | Mod: 50

## 2024-08-08 PROCEDURE — 99213 OFFICE O/P EST LOW 20 MIN: CPT

## 2024-08-08 NOTE — HISTORY OF PRESENT ILLNESS
[de-identified] : New Problem: bilateral  knee pain  fall happen: last thursday and has problem walking and fell on cement  Symptoms: hurts to walk and out of unbalance  Pain level: 4/10 hip pain is manageable  an knee pain 8/10 Physical therapy/ Home exercises: was in physical therapy session and was getting better

## 2024-08-08 NOTE — ASSESSMENT
[FreeTextEntry1] : Discussed at length with patient exam history and imaging at this time patient elects home exercises and therapy only offered cortisone injection to the right knee but she declined she will follow-up on an as-needed basis

## 2024-08-08 NOTE — PHYSICAL EXAM
[de-identified] : Bilateral knee  Constitutional:  The patient is healthy-appearing and in no apparent distress.   Gait: The patient ambulates with a normal gait and no limp.  Cardiovascular System:  The capillary refill is less than 2 seconds.   Skin:  There are no skin abnormalities.  Right Knee:   Bony Palpation:  There is tenderness of the medial joint line.  There is no tenderness of the lateral joint line. There is tenderness of the medial femoral chondyle. There is no tenderness of the lateral femoral chondyle. There is no tenderness of the tibial tubercle. There is no tenderness of the superior patella. There is no tenderness of the inferior patella. There is tenderness of the medial patellar facet. There is tenderness of the lateral patellar facet.  Soft Tissue Palpation:  There is no tenderness of the medial retinaculum. There is no tenderness of the lateral retinaculum. There is no tenderness of the quadriceps tendon. There is no tenderness of the patella tendon. There is no tenderness of the ITB. There is no tenderness of the pes anserine.  Active Range of Motion:  The range of motion at the knee actively and passively is full.   Special Tests:  There is a negative Apley. There is a negative Steinmanns.  There is a negative Lachman and Anterior Drawer. There is a negative Posterior Drawer.   There is no varus or valgus laxity.  Strength:  There is 5/5 hip flexion and 5/5 knee flexion and extension.    Left Knee:   Bony Palpation:  There is tenderness of the medial joint line.  There is no tenderness of the lateral joint line. There is tenderness of the medial femoral chondyle. There is no tenderness of the lateral femoral chondyle. There is no tenderness of the tibial tubercle. There is no tenderness of the superior patella. There is no tenderness of the inferior patella. There is tenderness of the medial patellar facet. There is tenderness of the lateral patellar facet.  Soft Tissue Palpation:  There is no tenderness of the medial retinaculum. There is no tenderness of the lateral retinaculum. There is no tenderness of the quadriceps tendon. There is no tenderness of the patella tendon. There is no tenderness of the ITB. There is no tenderness of the pes anserine.  Active Range of Motion:  The range of motion at the knee actively and passively is full.   Special Tests:  There is a negative Apley. There is a negative Steinmanns.  There is a negative Lachman and Anterior Drawer. There is a negative Posterior Drawer.   There is no varus or valgus laxity.  Strength:  There is 5/5 hip flexion and 5/5 knee flexion and extension.    Psychiatric:  The patient demonstrates a normal mood and affect and is active and alert  [de-identified] : X-ray bilateral knee: There is moderate medial and mild to moderate patellofemoral arthritis

## 2024-09-05 NOTE — ECT TREATMENT NOTE - NS ED BHA SUICIDALITY PRESENT CURRENT PASSIVE IDEATION
No previous uterine incision/[place] Pregnancy/Less than or equal to 4 previous vaginal births/No known bleeding disorder/No history of postpartum hemorrhage
No

## 2024-11-18 ENCOUNTER — APPOINTMENT (OUTPATIENT)
Dept: ORTHOPEDIC SURGERY | Facility: CLINIC | Age: 74
End: 2024-11-18

## 2024-11-21 ENCOUNTER — APPOINTMENT (OUTPATIENT)
Dept: ORTHOPEDIC SURGERY | Facility: CLINIC | Age: 74
End: 2024-11-21
Payer: MEDICARE

## 2024-11-21 DIAGNOSIS — M17.0 BILATERAL PRIMARY OSTEOARTHRITIS OF KNEE: ICD-10-CM

## 2024-11-21 PROCEDURE — 99213 OFFICE O/P EST LOW 20 MIN: CPT

## 2025-02-05 NOTE — ASU DISCHARGE PLAN (ADULT/PEDIATRIC) - NS MD DC FALL RISK RISK
02/05/25                            Etta Leong  [address] [facility] [address]    To Whom It May Concern:    This is to certify Etta Leong was evaluated with Frank Wright DO on 02/05/25 and can return to regular work on Friday 2/8.       Electronically signed by:  Frank Wright DO  [facility] [employer]  [address] [facility] [address]  Dept Phone: [phone]    For information on Fall & Injury Prevention, visit: [email or web address] OR  [email or web address] OR  https://www.cdc.gov/steadi/patient.html